# Patient Record
Sex: MALE | Race: BLACK OR AFRICAN AMERICAN | NOT HISPANIC OR LATINO | Employment: UNEMPLOYED | URBAN - METROPOLITAN AREA
[De-identification: names, ages, dates, MRNs, and addresses within clinical notes are randomized per-mention and may not be internally consistent; named-entity substitution may affect disease eponyms.]

---

## 2020-08-08 ENCOUNTER — OFFICE VISIT (OUTPATIENT)
Dept: URGENT CARE | Facility: CLINIC | Age: 30
End: 2020-08-08
Payer: MEDICAID

## 2020-08-08 VITALS
HEART RATE: 55 BPM | DIASTOLIC BLOOD PRESSURE: 64 MMHG | HEIGHT: 75 IN | RESPIRATION RATE: 18 BRPM | WEIGHT: 173 LBS | BODY MASS INDEX: 21.51 KG/M2 | SYSTOLIC BLOOD PRESSURE: 124 MMHG | TEMPERATURE: 98 F | OXYGEN SATURATION: 97 %

## 2020-08-08 DIAGNOSIS — J02.9 SORE THROAT: Primary | ICD-10-CM

## 2020-08-08 DIAGNOSIS — Z11.59 SCREENING FOR VIRAL DISEASE: ICD-10-CM

## 2020-08-08 LAB
CTP QC/QA: YES
MOLECULAR STREP A: NEGATIVE

## 2020-08-08 PROCEDURE — 87651 STREP A DNA AMP PROBE: CPT | Mod: QW,S$GLB,, | Performed by: NURSE PRACTITIONER

## 2020-08-08 PROCEDURE — 87651 POCT STREP A MOLECULAR: ICD-10-PCS | Mod: QW,S$GLB,, | Performed by: NURSE PRACTITIONER

## 2020-08-08 PROCEDURE — U0003 INFECTIOUS AGENT DETECTION BY NUCLEIC ACID (DNA OR RNA); SEVERE ACUTE RESPIRATORY SYNDROME CORONAVIRUS 2 (SARS-COV-2) (CORONAVIRUS DISEASE [COVID-19]), AMPLIFIED PROBE TECHNIQUE, MAKING USE OF HIGH THROUGHPUT TECHNOLOGIES AS DESCRIBED BY CMS-2020-01-R: HCPCS

## 2020-08-08 PROCEDURE — 99202 PR OFFICE/OUTPT VISIT, NEW, LEVL II, 15-29 MIN: ICD-10-PCS | Mod: S$GLB,,, | Performed by: NURSE PRACTITIONER

## 2020-08-08 PROCEDURE — 99202 OFFICE O/P NEW SF 15 MIN: CPT | Mod: S$GLB,,, | Performed by: NURSE PRACTITIONER

## 2020-08-08 NOTE — PROGRESS NOTES
"Subjective:       Patient ID: Lior Nguyen III is a 30 y.o. male.    Vitals:  height is 6' 3" (1.905 m) and weight is 78.5 kg (173 lb). His temperature is 98 °F (36.7 °C). His blood pressure is 124/64 and his pulse is 55 (abnormal). His respiration is 18 and oxygen saturation is 97%.     Chief Complaint: COVID-19 Concerns    This is a 30 y.o. male who presents today with a chief complaint of sore throat for 2 days and diarrhea for 3 days. Patient wants to be tested for Covid-19.  Provider note begins below  Denies fever. Denies ill contacts.      Sore Throat   This is a new problem. The current episode started in the past 7 days. The problem has been gradually worsening. There has been no fever. The pain is at a severity of 4/10. The pain is mild. Associated symptoms include diarrhea and trouble swallowing. Pertinent negatives include no congestion, coughing, ear pain, shortness of breath, stridor or vomiting. He has had no exposure to strep or mono. He has tried acetaminophen for the symptoms. The treatment provided mild relief.   Diarrhea   This is a new problem. The current episode started in the past 7 days. The problem occurs 5 to 10 times per day. The problem has been unchanged. The stool consistency is described as watery. The patient states that diarrhea awakens him from sleep. Pertinent negatives include no chills, coughing, fever, myalgias or vomiting. Nothing aggravates the symptoms. There are no known risk factors. Treatments tried: peptobismol. The treatment provided mild relief. There is no history of irritable bowel syndrome.       Constitution: Negative for chills, sweating, fatigue and fever.   HENT: Positive for sore throat and trouble swallowing. Negative for ear pain, congestion, sinus pain, sinus pressure and voice change.    Neck: Negative for painful lymph nodes.   Eyes: Negative for eye redness.   Respiratory: Negative for chest tightness, cough, sputum production, bloody sputum, COPD, " shortness of breath, stridor, wheezing and asthma.    Gastrointestinal: Positive for diarrhea. Negative for nausea and vomiting.   Musculoskeletal: Negative for muscle ache.   Skin: Negative for rash.   Allergic/Immunologic: Negative for seasonal allergies and asthma.   Hematologic/Lymphatic: Negative for swollen lymph nodes.       Objective:      Physical Exam   Constitutional: He is oriented to person, place, and time.   HENT:   Head: Normocephalic and atraumatic.   Cardiovascular: Bradycardia present.   Pulmonary/Chest: Effort normal. No respiratory distress.   Abdominal: Normal appearance.   Neurological: He is alert and oriented to person, place, and time. Psychiatric: His behavior is normal. Mood normal.         Results for orders placed or performed in visit on 08/08/20   POCT Strep A, Molecular   Result Value Ref Range    Molecular Strep A, POC Negative Negative     Acceptable Yes      Assessment:       1. Sore throat    2. Screening for viral disease        Plan:         Strep test negative.    Counseled patient and answered questions in regards to COVID-19 testing and diagnosis.    Sore throat  -     POCT Strep A, Molecular  -     COVID-19 Routine Screening    Screening for viral disease  -     COVID-19 Routine Screening         Patient Instructions   Guidelines for General Prevention of COVID-19    o Take steps to protect yourself from COVID-19. Perform hand hygiene frequently. Wash your hands often with soap and water for at least 20 seconds of use and alcohol-based hand , covering all surfaces of your hands and rubbing them together until they feel dry.  o Avoid touching your eyes, nose, and mouth with unwashed hands.  o Avoid close contact with people and stay home if youre sick, except to get medical care.   o Cover coughs and sneezes with a tissue, or use the inside of your elbow. Immediately wash your hands or use hand .     For more information, see CDC link  below:    https://www.cdc.gov/coronavirus/2019-ncov/hcp/guidance-prevent-spread.html#precautions

## 2020-08-09 LAB — SARS-COV-2 RNA RESP QL NAA+PROBE: DETECTED

## 2020-08-10 ENCOUNTER — TELEPHONE (OUTPATIENT)
Dept: URGENT CARE | Facility: CLINIC | Age: 30
End: 2020-08-10

## 2020-08-10 DIAGNOSIS — U07.1 COVID-19 VIRUS DETECTED: ICD-10-CM

## 2020-08-10 NOTE — LETTER
[address] [facility] ? [place], [address] ? Phone [phone] ? Fax [phone]           Return to Work/School    Patient: Lior Nguyen III  YOB: 1990   Date: 08/10/2020      To Whom It May Concern:     Lior Nguyen III was in contact with/seen in my office on 08/08/2020. COVID-19 is present in our communities across the state. Not all patients are eligible or appropriate to be tested. In this situation, your employee meets the following criteria:     Lior Nguyen III has met the criteria for COVID-19 testing and has a POSITIVE result. He can return to work once they are asymptomatic for 24 hours without the use of fever reducing medications AND at least ten days from the start of symptoms (or from the first positive result if they have no symptoms).      If you have any questions or concerns, or if I can be of further assistance, please do not hesitate to contact me.     Sincerely,    Briseyda Henson PA-C

## 2020-08-10 NOTE — TELEPHONE ENCOUNTER
DISCUSSED POSITIVE COVID RESULTS WITH PATIENT.  DISCUSSED HOME ISOLATION PRECAUTIONS.  All of his questions were answered he verbalized understanding.    Your test was POSITIVE for COVID-19 (coronavirus).     Instructions for Home Care of Patients and Caretakers with Coronavirus Disease 2019     Limit visitors to the home.  Older persons and those that have chronic medical conditions such as diabetes, lung and heart disease are at increased risk for illness.    If possible, patients should use a separate bedroom while recovering. Caregivers and household members should avoid prolonged contact with the patient which means to stay 6 feet away and avoid contact with cough droplets.  When close contact is necessary, wash your hands before and immediately after contact.    Perform hand hygiene frequently. Wash your hands often with soap and water for at least 20 seconds or use an alcohol-based hand , covering all surfaces of your hands and rubbing them together until they feel dry.    Avoid touching your eyes, nose, and mouth with unwashed hands.   Avoid sharing household items with the patient. You should not share dishes, drinking glasses, cups, eating utensils, towels, bedding, or other items. After the patient uses these items, you should wash them thoroughly.   Wash laundry thoroughly.   o Immediately remove and wash clothes or bedding that have blood, stool, or body fluids on them.   Clean all high-touch surfaces, such as counters, tabletops, doorknobs, bathroom fixtures, toilets, phones, keyboards, tablets, and bedside tables, every day.   o Use a household cleaning spray or wipe, according to the label instructions. Labels contain instructions for safe and effective use of the cleaning product including precautions you should take when applying the product, such as wearing gloves and making sure you have good ventilation during use of the product.    For more information see CDC link below.       https://www.cdc.gov/coronavirus/2019-ncov/hcp/guidance-prevent-spread.html#precautions               If your symptoms worsen or if you have any other concerns, please contact [facility] at [phone].

## 2021-04-12 ENCOUNTER — PATIENT MESSAGE (OUTPATIENT)
Dept: RESEARCH | Facility: HOSPITAL | Age: 31
End: 2021-04-12

## 2021-05-13 ENCOUNTER — OFFICE VISIT (OUTPATIENT)
Dept: URGENT CARE | Facility: CLINIC | Age: 31
End: 2021-05-13
Payer: MEDICAID

## 2021-05-13 VITALS
OXYGEN SATURATION: 98 % | HEART RATE: 61 BPM | TEMPERATURE: 99 F | HEIGHT: 75 IN | WEIGHT: 173 LBS | DIASTOLIC BLOOD PRESSURE: 69 MMHG | RESPIRATION RATE: 16 BRPM | SYSTOLIC BLOOD PRESSURE: 106 MMHG | BODY MASS INDEX: 21.51 KG/M2

## 2021-05-13 DIAGNOSIS — R10.84 GENERALIZED ABDOMINAL PAIN: Primary | ICD-10-CM

## 2021-05-13 LAB
CTP QC/QA: YES
CTP QC/QA: YES
MOLECULAR STREP A: NEGATIVE
SARS-COV-2 RDRP RESP QL NAA+PROBE: NEGATIVE

## 2021-05-13 PROCEDURE — 99214 OFFICE O/P EST MOD 30 MIN: CPT | Mod: S$GLB,,, | Performed by: PHYSICIAN ASSISTANT

## 2021-05-13 PROCEDURE — 87651 POCT STREP A MOLECULAR: ICD-10-PCS | Mod: QW,S$GLB,, | Performed by: PHYSICIAN ASSISTANT

## 2021-05-13 PROCEDURE — 87651 STREP A DNA AMP PROBE: CPT | Mod: QW,S$GLB,, | Performed by: PHYSICIAN ASSISTANT

## 2021-05-13 PROCEDURE — 74019 XR ABDOMEN FLAT AND ERECT: ICD-10-PCS | Mod: FY,S$GLB,, | Performed by: RADIOLOGY

## 2021-05-13 PROCEDURE — 74019 RADEX ABDOMEN 2 VIEWS: CPT | Mod: FY,S$GLB,, | Performed by: RADIOLOGY

## 2021-05-13 PROCEDURE — 99214 PR OFFICE/OUTPT VISIT, EST, LEVL IV, 30-39 MIN: ICD-10-PCS | Mod: S$GLB,,, | Performed by: PHYSICIAN ASSISTANT

## 2021-05-13 PROCEDURE — U0002 COVID-19 LAB TEST NON-CDC: HCPCS | Mod: QW,S$GLB,, | Performed by: PHYSICIAN ASSISTANT

## 2021-05-13 PROCEDURE — U0002: ICD-10-PCS | Mod: QW,S$GLB,, | Performed by: PHYSICIAN ASSISTANT

## 2021-08-15 ENCOUNTER — OFFICE VISIT (OUTPATIENT)
Dept: URGENT CARE | Facility: CLINIC | Age: 31
End: 2021-08-15
Payer: MEDICAID

## 2021-08-15 VITALS
WEIGHT: 165 LBS | RESPIRATION RATE: 19 BRPM | TEMPERATURE: 99 F | SYSTOLIC BLOOD PRESSURE: 135 MMHG | HEIGHT: 74 IN | HEART RATE: 64 BPM | BODY MASS INDEX: 21.17 KG/M2 | DIASTOLIC BLOOD PRESSURE: 85 MMHG | OXYGEN SATURATION: 98 %

## 2021-08-15 DIAGNOSIS — Z20.822 COVID-19 RULED OUT: ICD-10-CM

## 2021-08-15 DIAGNOSIS — J06.9 UPPER RESPIRATORY VIRUS: Primary | ICD-10-CM

## 2021-08-15 LAB
CTP QC/QA: YES
SARS-COV-2 RDRP RESP QL NAA+PROBE: NEGATIVE

## 2021-08-15 PROCEDURE — U0002 COVID-19 LAB TEST NON-CDC: HCPCS | Mod: QW,S$GLB,, | Performed by: NURSE PRACTITIONER

## 2021-08-15 PROCEDURE — U0002: ICD-10-PCS | Mod: QW,S$GLB,, | Performed by: NURSE PRACTITIONER

## 2021-08-15 PROCEDURE — 99213 PR OFFICE/OUTPT VISIT, EST, LEVL III, 20-29 MIN: ICD-10-PCS | Mod: S$GLB,,, | Performed by: NURSE PRACTITIONER

## 2021-08-15 PROCEDURE — 99213 OFFICE O/P EST LOW 20 MIN: CPT | Mod: S$GLB,,, | Performed by: NURSE PRACTITIONER

## 2021-09-10 ENCOUNTER — IMMUNIZATION (OUTPATIENT)
Dept: INTERNAL MEDICINE | Facility: CLINIC | Age: 31
End: 2021-09-10
Payer: MEDICAID

## 2021-09-10 DIAGNOSIS — Z23 NEED FOR VACCINATION: Primary | ICD-10-CM

## 2021-09-10 PROCEDURE — 91301 COVID-19, MRNA, LNP-S, PF, 100 MCG/0.5 ML DOSE VACCINE: ICD-10-PCS | Mod: ,,, | Performed by: INTERNAL MEDICINE

## 2021-09-10 PROCEDURE — 0012A COVID-19, MRNA, LNP-S, PF, 100 MCG/0.5 ML DOSE VACCINE: ICD-10-PCS | Mod: CV19,,, | Performed by: INTERNAL MEDICINE

## 2021-09-10 PROCEDURE — 91301 COVID-19, MRNA, LNP-S, PF, 100 MCG/0.5 ML DOSE VACCINE: CPT | Mod: ,,, | Performed by: INTERNAL MEDICINE

## 2021-09-10 PROCEDURE — 0012A COVID-19, MRNA, LNP-S, PF, 100 MCG/0.5 ML DOSE VACCINE: CPT | Mod: CV19,,, | Performed by: INTERNAL MEDICINE

## 2021-12-18 ENCOUNTER — OFFICE VISIT (OUTPATIENT)
Dept: URGENT CARE | Facility: CLINIC | Age: 31
End: 2021-12-18
Payer: MEDICAID

## 2021-12-18 VITALS
OXYGEN SATURATION: 95 % | BODY MASS INDEX: 21.17 KG/M2 | TEMPERATURE: 98 F | HEIGHT: 74 IN | WEIGHT: 165 LBS | HEART RATE: 74 BPM | DIASTOLIC BLOOD PRESSURE: 71 MMHG | RESPIRATION RATE: 16 BRPM | SYSTOLIC BLOOD PRESSURE: 106 MMHG

## 2021-12-18 DIAGNOSIS — M25.561 ACUTE PAIN OF RIGHT KNEE: Primary | ICD-10-CM

## 2021-12-18 PROCEDURE — 99214 PR OFFICE/OUTPT VISIT, EST, LEVL IV, 30-39 MIN: ICD-10-PCS | Mod: S$GLB,,, | Performed by: PHYSICIAN ASSISTANT

## 2021-12-18 PROCEDURE — 99214 OFFICE O/P EST MOD 30 MIN: CPT | Mod: S$GLB,,, | Performed by: PHYSICIAN ASSISTANT

## 2021-12-18 PROCEDURE — 73562 XR KNEE 3 VIEW RIGHT: ICD-10-PCS | Mod: FY,RT,S$GLB, | Performed by: RADIOLOGY

## 2021-12-18 PROCEDURE — 73562 X-RAY EXAM OF KNEE 3: CPT | Mod: FY,RT,S$GLB, | Performed by: RADIOLOGY

## 2021-12-18 RX ORDER — METHOCARBAMOL 750 MG/1
750 TABLET, FILM COATED ORAL 3 TIMES DAILY
Qty: 15 TABLET | Refills: 0 | Status: SHIPPED | OUTPATIENT
Start: 2021-12-18 | End: 2021-12-23

## 2021-12-18 RX ORDER — NAPROXEN 500 MG/1
500 TABLET ORAL 2 TIMES DAILY
Qty: 30 TABLET | Refills: 0 | Status: SHIPPED | OUTPATIENT
Start: 2021-12-18 | End: 2022-01-02

## 2021-12-20 ENCOUNTER — TELEPHONE (OUTPATIENT)
Dept: URGENT CARE | Facility: CLINIC | Age: 31
End: 2021-12-20
Payer: MEDICAID

## 2022-07-08 ENCOUNTER — OFFICE VISIT (OUTPATIENT)
Dept: URGENT CARE | Facility: CLINIC | Age: 32
End: 2022-07-08
Payer: MEDICAID

## 2022-07-08 VITALS
WEIGHT: 147 LBS | SYSTOLIC BLOOD PRESSURE: 117 MMHG | HEIGHT: 74 IN | DIASTOLIC BLOOD PRESSURE: 63 MMHG | HEART RATE: 61 BPM | RESPIRATION RATE: 18 BRPM | OXYGEN SATURATION: 99 % | TEMPERATURE: 98 F | BODY MASS INDEX: 18.87 KG/M2

## 2022-07-08 DIAGNOSIS — F41.9 ANXIETY: ICD-10-CM

## 2022-07-08 DIAGNOSIS — R10.9 ABDOMINAL PAIN, UNSPECIFIED ABDOMINAL LOCATION: Primary | ICD-10-CM

## 2022-07-08 DIAGNOSIS — Z76.89 ENCOUNTER TO ESTABLISH CARE WITH NEW DOCTOR: ICD-10-CM

## 2022-07-08 DIAGNOSIS — J06.9 ACUTE URI: ICD-10-CM

## 2022-07-08 LAB
BILIRUB UR QL STRIP: NEGATIVE
CTP QC/QA: YES
GLUCOSE UR QL STRIP: NEGATIVE
KETONES UR QL STRIP: NEGATIVE
LEUKOCYTE ESTERASE UR QL STRIP: NEGATIVE
PH, POC UA: 5 (ref 5–8)
POC BLOOD, URINE: NEGATIVE
POC NITRATES, URINE: NEGATIVE
PROT UR QL STRIP: POSITIVE
SARS-COV-2 RDRP RESP QL NAA+PROBE: NEGATIVE
SP GR UR STRIP: 1.02 (ref 1–1.03)
UROBILINOGEN UR STRIP-ACNC: NORMAL (ref 0.3–2.2)

## 2022-07-08 PROCEDURE — 3008F BODY MASS INDEX DOCD: CPT | Mod: CPTII,S$GLB,, | Performed by: NURSE PRACTITIONER

## 2022-07-08 PROCEDURE — 3078F PR MOST RECENT DIASTOLIC BLOOD PRESSURE < 80 MM HG: ICD-10-PCS | Mod: CPTII,S$GLB,, | Performed by: NURSE PRACTITIONER

## 2022-07-08 PROCEDURE — 81003 URINALYSIS AUTO W/O SCOPE: CPT | Mod: QW,S$GLB,, | Performed by: NURSE PRACTITIONER

## 2022-07-08 PROCEDURE — U0002: ICD-10-PCS | Mod: QW,S$GLB,, | Performed by: NURSE PRACTITIONER

## 2022-07-08 PROCEDURE — 81003 POCT URINALYSIS, DIPSTICK, AUTOMATED, W/O SCOPE: ICD-10-PCS | Mod: QW,S$GLB,, | Performed by: NURSE PRACTITIONER

## 2022-07-08 PROCEDURE — 1159F PR MEDICATION LIST DOCUMENTED IN MEDICAL RECORD: ICD-10-PCS | Mod: CPTII,S$GLB,, | Performed by: NURSE PRACTITIONER

## 2022-07-08 PROCEDURE — 3074F SYST BP LT 130 MM HG: CPT | Mod: CPTII,S$GLB,, | Performed by: NURSE PRACTITIONER

## 2022-07-08 PROCEDURE — 99214 OFFICE O/P EST MOD 30 MIN: CPT | Mod: S$GLB,,, | Performed by: NURSE PRACTITIONER

## 2022-07-08 PROCEDURE — 99214 PR OFFICE/OUTPT VISIT, EST, LEVL IV, 30-39 MIN: ICD-10-PCS | Mod: S$GLB,,, | Performed by: NURSE PRACTITIONER

## 2022-07-08 PROCEDURE — 74019 XR ABDOMEN FLAT AND ERECT: ICD-10-PCS | Mod: FY,S$GLB,, | Performed by: RADIOLOGY

## 2022-07-08 PROCEDURE — 3078F DIAST BP <80 MM HG: CPT | Mod: CPTII,S$GLB,, | Performed by: NURSE PRACTITIONER

## 2022-07-08 PROCEDURE — 3008F PR BODY MASS INDEX (BMI) DOCUMENTED: ICD-10-PCS | Mod: CPTII,S$GLB,, | Performed by: NURSE PRACTITIONER

## 2022-07-08 PROCEDURE — 1159F MED LIST DOCD IN RCRD: CPT | Mod: CPTII,S$GLB,, | Performed by: NURSE PRACTITIONER

## 2022-07-08 PROCEDURE — U0002 COVID-19 LAB TEST NON-CDC: HCPCS | Mod: QW,S$GLB,, | Performed by: NURSE PRACTITIONER

## 2022-07-08 PROCEDURE — 1160F RVW MEDS BY RX/DR IN RCRD: CPT | Mod: CPTII,S$GLB,, | Performed by: NURSE PRACTITIONER

## 2022-07-08 PROCEDURE — 3074F PR MOST RECENT SYSTOLIC BLOOD PRESSURE < 130 MM HG: ICD-10-PCS | Mod: CPTII,S$GLB,, | Performed by: NURSE PRACTITIONER

## 2022-07-08 PROCEDURE — 1160F PR REVIEW ALL MEDS BY PRESCRIBER/CLIN PHARMACIST DOCUMENTED: ICD-10-PCS | Mod: CPTII,S$GLB,, | Performed by: NURSE PRACTITIONER

## 2022-07-08 PROCEDURE — 74019 RADEX ABDOMEN 2 VIEWS: CPT | Mod: FY,S$GLB,, | Performed by: RADIOLOGY

## 2022-07-08 RX ORDER — HYDROXYZINE HYDROCHLORIDE 25 MG/1
25 TABLET, FILM COATED ORAL NIGHTLY PRN
Qty: 20 TABLET | Refills: 0 | Status: ON HOLD | OUTPATIENT
Start: 2022-07-08 | End: 2023-01-11

## 2022-07-08 NOTE — PATIENT INSTRUCTIONS
PLEASE READ YOUR DISCHARGE INSTRUCTIONS ENTIRELY AS IT CONTAINS IMPORTANT INFORMATION.    PLENTY OF FLUIDS    Try an over the counter laxative like miralax and glycerin suppositories.     High fiber meals    Try an over the counter fleets enema    Please go to the ER if you experience worsening of symptoms, inability to pass gas or stool, blood in your vomit or stool or dark black color, passing out, severely worsening pain.     Please see your primary care doctor if symptoms do not improve    You must understand that you have received an Urgent Care treatment only and that you may be released before all of your medical problems are known or treated.  WE CANNOT RULE OUT ALL POSSIBLE CAUSES OF YOUR SYMPTOMS IN THE URGENT CARE SETTING PLEASE GO TO THE ER IF YOU FEELS YOUR CONDITION IS WORSENING OR YOU WOULD LIKE EMERGENT EVALUATION.

## 2022-07-08 NOTE — PROGRESS NOTES
"Subjective:       Patient ID: Lior Nguyen III is a 32 y.o. male.    Vitals:  height is 6' 2" (1.88 m) and weight is 66.7 kg (147 lb). His oral temperature is 98.1 °F (36.7 °C). His blood pressure is 117/63 and his pulse is 61. His respiration is 18 and oxygen saturation is 99%.     Chief Complaint: Abdominal Pain    Pt presents to urgent care for  pain in abdomen w/ diarrhea.  Pt did not take in medication for abdominal pain.     Pt also presents to urgent care for sneezing, pt has been taking zyrtec & benadryl for the symptoms.     Provider note below:  This is a 32 y.o. male who presents today with a chief complaint of abdominal pain started yesterday, patient reports he has been dealing with diarrhea for few months now, patient reports diarrhea 2 times per day, patient reports he tried to changes diet which helped with the diarrhea, patient also reports sneezing and post nasal drip and  taking Zyrtec and Benadryl for the symptoms, denies any exposure to COVID-19, denies urinary symptoms, denies urinary frequency, urgency, dysuria hematuria, denies back pain or flank pain, denies fever, body aches or chills, denies cough, wheezing or shortness of breath, denies nausea, vomiting, denies chest pain or dizziness positional lightheadedness, denies sore throat or trouble swallowing, denies loss of taste or smell, or any other symptoms  While discussing the abdominal x-ray results with patient for discharge, patient reported that he would like to address one other issue, patient reported that he does have history of anxiety and because of  anxiety sometimes he cannot sleep for 2-3 days, patient reports history of schizophrenia  in the family (grandfather) with family history of parents with suicidal ideation, patient denies any suicidal or homicidal ideation, patient reports he has been dealing with this issue for past 10 years and  has seen Psychiatry who prescribed him the medication that makes him sleepy for 12 " hours so that is why he stopped taking the medication as he works 2 jobs, I discussed with patient in detail that he should obtain further evaluation if he feels any suicidal or homicidal ideation or for worsening symptoms, declined any suicidal or homicidal ideation or plan    Abdominal Pain  This is a new problem. The current episode started yesterday. The onset quality is sudden. The problem has been waxing and waning. The pain is located in the generalized abdominal region. The pain is moderate. The quality of the pain is sharp and dull. The abdominal pain does not radiate. Associated symptoms include diarrhea, flatus and headaches. Pertinent negatives include no constipation, fever, nausea or vomiting. Exacerbated by: spicy food. Relieved by: imodium, pepto bismol, nexium. He has tried nothing for the symptoms. The treatment provided no relief.       Constitution: Negative for chills, sweating, fatigue and fever.   HENT: Positive for postnasal drip. Negative for sinus pain, sinus pressure and sore throat.    Cardiovascular: Negative for chest pain.   Respiratory: Negative for chest tightness, sputum production, shortness of breath and wheezing.    Gastrointestinal: Positive for abdominal pain and diarrhea. Negative for nausea, vomiting and constipation.   Allergic/Immunologic: Negative for environmental allergies and seasonal allergies.   Neurological: Positive for headaches. Negative for dizziness, light-headedness and altered mental status.   Psychiatric/Behavioral: Negative for altered mental status, hallucinations, homicidal ideas, suicidal ideas, self-injury and substance abuse.       Objective:      Physical Exam   Constitutional: He is oriented to person, place, and time. He appears well-developed.   HENT:   Head: Normocephalic and atraumatic.   Ears:   Right Ear: External ear normal.   Left Ear: External ear normal.   Nose: Nose normal.   Mouth/Throat: Mucous membranes are normal.   Eyes: Conjunctivae  and lids are normal. Extraocular movement intact   Neck: Trachea normal. Neck supple.   Cardiovascular: Normal rate, regular rhythm and normal heart sounds.   Pulmonary/Chest: Effort normal and breath sounds normal. No respiratory distress.   Abdominal: Normal appearance and bowel sounds are normal. He exhibits no distension, no abdominal bruit, no pulsatile midline mass and no mass. Soft. There is generalized abdominal tenderness. There is no rebound, no guarding, no tenderness at McBurney's point, negative Lazaro's sign, no left CVA tenderness, negative Rovsing's sign, negative psoas sign, no right CVA tenderness and negative obturator sign.   Musculoskeletal: Normal range of motion.         General: Normal range of motion.   Neurological: no focal deficit. He is alert and oriented to person, place, and time. He has normal strength.   Skin: Skin is warm, dry, intact, not diaphoretic and not pale.   Psychiatric: His speech is normal and behavior is normal. Mood, affect, judgment and thought content normal. Cognition normalHe expresses no homicidal and no suicidal ideation. He expresses no suicidal plans and no homicidal plans.   Nursing note and vitals reviewed.      X-Ray Abdomen Flat And Erect    Result Date: 7/8/2022  EXAMINATION: XR ABDOMEN FLAT AND ERECT CLINICAL HISTORY: Unspecified abdominal pain. TECHNIQUE: Flat and erect frontal views of the abdomen were performed. COMPARISON: 05/13/2021. FINDINGS: Bowel gas pattern is nonobstructive.  No evidence of pneumoperitoneum.  No large volume fecal burden.  No pathologic calcifications.  Mild left convex scoliotic curvature in the lumbar spine.     Nonobstructive bowel gas pattern. Electronically signed by: Gary Smith MD Date:    07/08/2022 Time:    11:21  Results for orders placed or performed in visit on 07/08/22   POCT COVID-19 Rapid Screening   Result Value Ref Range    POC Rapid COVID Negative Negative     Acceptable Yes    POCT  Urinalysis, Dipstick, Automated, W/O Scope   Result Value Ref Range    POC Blood, Urine Negative Negative    POC Bilirubin, Urine Negative Negative    POC Urobilinogen, Urine Normal 0.3 - 2.2    POC Ketones, Urine Negative Negative    POC Protein, Urine Positive (A) Negative    POC Nitrates, Urine Negative Negative    POC Glucose, Urine Negative Negative    pH, UA 5.0 5 - 8    POC Specific Gravity, Urine 1.025 1.003 - 1.029    POC Leukocytes, Urine Negative Negative         Patient in no acute distress.  Vitals reassuring.  Discussed results/diagnosis/plan in depth with patient in clinic. Strict precautions given to patient to monitor for worsening signs and symptoms. Advised to follow up with primary.All questions answered. Strict ER precautions given. If your symptoms worsens or fail to improve you should go to the Emergency Room. Discharge and follow-up instructions given verbally/printed. Discharge and follow-up instructions discussed with the patient who expressed understanding and willingness to comply with my recommendations.Patient voiced understanding and in agreement with current treatment plan.     Please be advised this text was dictated with HipWay software and may contain errors due to translation.      Assessment:       1. Abdominal pain, unspecified abdominal location    2. Acute URI    3. Encounter to establish care with new doctor    4. Anxiety          Plan:         Abdominal pain, unspecified abdominal location  -     POCT Urinalysis, Dipstick, Automated, W/O Scope  -     X-Ray Abdomen Flat And Erect; Future; Expected date: 07/08/2022    Acute URI  -     POCT COVID-19 Rapid Screening    Encounter to establish care with new doctor  -     Ambulatory referral/consult to Family Practice    Anxiety  -     hydrOXYzine HCL (ATARAX) 25 MG tablet; Take 1 tablet (25 mg total) by mouth nightly as needed. Prn insomnia  Dispense: 20 tablet; Refill: 0           Medical Decision Making:   History:   Old  Records Summarized: records from clinic visits.  Differential Diagnosis:   Differential diagnoses included but not limited to UTI, pyelonephritis, gallstones, acute pancreatitis, diverticulitis, appendicitis, renal colic, constipation, GERD, IBD    Clinical Tests:   Radiological Study: Ordered and Reviewed  Urgent Care Management:  Patient in no acute distress.  Vitals reassuring.  On exam patient is nontoxic appearing and afebrile.  Negative COVID-19 and urinalysis results discussed with patient detail.  Abdominal x-ray results discussed with patient in detail.  Physical examination consistent with generalized abdominal tenderness.  No guarding, rigidity or rebound tenderness noted.  Over-the-counter medications discussed with patient in detail.  Proper hydration advised.  Referred to family practice to establish care.Patient voiced understanding and in agreement with current treatment plan.  See detailed HPI, patient reported history of anxiety in the past, patient reported dealing with anxiety for past 10 years was prescribed the medication but could not take it due to the side effects of drowsiness and he does work 2 jobs.  Patient reported history of psychiatric illness in the family.  Patient denies any suicidal or homicidal ideation.  Patient reports due to his anxiety he cannot sleep sometimes 2-3 days and experiences auditory hallucination at that time.  Denies any recent anxiety issue, insomnia or other worsening symptoms.  I again discussed with patient in detail and patient declined suicidal or homicidal ideation or hallucinations.  I will prescribe limited amount of Atarax to help with anxiety and insomnia.  I strongly recommended patient to obtain further evaluation or go to ER for worsening symptoms.  Patient is stable to discharge with strict ER precautions.  Attempted to schedule the appointment with the family practice to establish care, patient insurance is Medicaid and unable to find any  availability secondary to his insurance.  Community resources discussed to patient to establish care with primary. Patient voiced understanding and in agreement with current treatment plan.    >45 minutes   Other:   I have discussed this case with another health care provider.       <> Summary of the Discussion: Gary Ch NP       Patient Instructions   PLEASE READ YOUR DISCHARGE INSTRUCTIONS ENTIRELY AS IT CONTAINS IMPORTANT INFORMATION.    PLENTY OF FLUIDS    Try an over the counter laxative like miralax and glycerin suppositories.     High fiber meals    Try an over the counter fleets enema    Please go to the ER if you experience worsening of symptoms, inability to pass gas or stool, blood in your vomit or stool or dark black color, passing out, severely worsening pain.     Please see your primary care doctor if symptoms do not improve    You must understand that you have received an Urgent Care treatment only and that you may be released before all of your medical problems are known or treated.  WE CANNOT RULE OUT ALL POSSIBLE CAUSES OF YOUR SYMPTOMS IN THE URGENT CARE SETTING PLEASE GO TO THE ER IF YOU FEELS YOUR CONDITION IS WORSENING OR YOU WOULD LIKE EMERGENT EVALUATION.

## 2022-07-16 ENCOUNTER — TELEPHONE (OUTPATIENT)
Dept: URGENT CARE | Facility: CLINIC | Age: 32
End: 2022-07-16
Payer: MEDICAID

## 2022-07-21 ENCOUNTER — OFFICE VISIT (OUTPATIENT)
Dept: FAMILY MEDICINE | Facility: HOSPITAL | Age: 32
End: 2022-07-21
Attending: FAMILY MEDICINE
Payer: MEDICAID

## 2022-07-21 VITALS
SYSTOLIC BLOOD PRESSURE: 109 MMHG | DIASTOLIC BLOOD PRESSURE: 74 MMHG | BODY MASS INDEX: 18.82 KG/M2 | HEIGHT: 74 IN | HEART RATE: 99 BPM | WEIGHT: 146.63 LBS

## 2022-07-21 DIAGNOSIS — F17.209 TOBACCO USE DISORDER, CONTINUOUS: ICD-10-CM

## 2022-07-21 DIAGNOSIS — G47.00 INSOMNIA, UNSPECIFIED TYPE: Primary | ICD-10-CM

## 2022-07-21 DIAGNOSIS — Z00.00 WELLNESS EXAMINATION: ICD-10-CM

## 2022-07-21 PROCEDURE — 99214 OFFICE O/P EST MOD 30 MIN: CPT

## 2022-07-21 RX ORDER — QUETIAPINE FUMARATE 25 MG/1
25 TABLET, FILM COATED ORAL NIGHTLY
Qty: 30 TABLET | Refills: 11 | Status: SHIPPED | OUTPATIENT
Start: 2022-07-21 | End: 2022-08-01 | Stop reason: SDUPTHER

## 2022-07-22 NOTE — PROGRESS NOTES
[facility]  History & Physical    SUBJECTIVE:     Chief Complaint:   Chief Complaint   Patient presents with    Depression    Establish Care    Insomnia    Anxiety    Diarrhea    Fatigue     Since 2005       History of Present Illness:  32 y.o. male who  has a past medical history of Anxiety. presents to clinic today for establishing care.  Patient notes insomnia, anxiety and a history of several emotionally traumatic episodes from the past. Recently patient   Endorses auditory hallucinations, but denies thoughts of suicide or causing harm. He describes periods (sometimes days) of being unable to sleep, but   Does not describe delusions of grandeur, hypersexuality or similar emotions.  He also notes chronic diarrhea, occasional constipation (no blood or mucus) with no recent changes, weight loss or other constitutional symptoms.   He is working to quit smoking/vaping.   He wishes to speak to counselor.   Psych screening questionnaires:   RADHA-9 - 20  KEVAN-7 - 21  Mood disorder questionnaire - positive (on file)     Notes hx of schizophrenia in grandfather.    Allergies:  Review of patient's allergies indicates:  No Known Allergies    Home Medications:  Current Outpatient Medications on File Prior to Visit   Medication Sig    hydrOXYzine HCL (ATARAX) 25 MG tablet Take 1 tablet (25 mg total) by mouth nightly as needed. Prn insomnia    ibuprofen (ADVIL,MOTRIN) 800 MG tablet Take 1 tablet (800 mg total) by mouth every 8 (eight) hours as needed for Pain. (Patient not taking: No sig reported)     No current facility-administered medications on file prior to visit.       Past Medical History:   Diagnosis Date    Anxiety      No past surgical history on file.  Family History   Problem Relation Age of Onset    No Known Problems Mother     No Known Problems Father      Social History     Tobacco Use    Smoking status: Current Every Day Smoker     Packs/day: 0.50     Types: Vaping with nicotine    Smokeless  tobacco: Never Used   Substance Use Topics    Alcohol use: Yes     Alcohol/week: 2.0 standard drinks     Types: 2 Glasses of wine per week    Drug use: Never        ROS     OBJECTIVE:     Vital Signs:  Pulse: 99 (07/21/22 1006)  BP: 109/74 (07/21/22 1006)    Physical Exam    Gen: patient is anxious, but breathing well  HENT: normocephalic, atraumatic  Cardio: S1/S2 normal rate and rhythm   Resp: lungs clear bilat  Abd: soft, supple, nontender  Psych:   RADHA-9 - 20  KVEAN-7 - 21  Mood disorder questionnaire - positive (on file)     A/P:  Lior was seen today for depression, establish care, insomnia, anxiety, diarrhea and fatigue.    Diagnoses and all orders for this visit:    Insomnia, unspecified type  -     QUEtiapine (SEROQUEL) 25 MG Tab; Take 1 tablet (25 mg total) by mouth every evening.  -     Ambulatory referral/consult to Psychology; Future    Tobacco use disorder, continuous  -     Ambulatory referral/consult to Smoking Cessation Program; Future    Wellness examination  -     Comprehensive Metabolic Panel; Future  -     CBC Auto Differential; Future  -     Lipid Panel; Future  -     Hemoglobin A1C; Future  -     TSH; Future          DUE AT NEXT/FUTURE VISIT:  Follow up one week  States will try to speak to outside psychologist, has appointment to see Dr. Blackmon in Sept.      No follow-ups on file.      John Villalobos  [facility], PGY-1  07/22/2022    This note was partially created using [employer] Voice Recognition software. Typographical and content errors may occur with this process. While efforts are made to detect and correct such errors, in some cases errors will persist. For this reason, wording in this document should be considered in the proper context and not strictly verbatim     The following information is provided to all patients.  This information is to help you find resources for any of the problems found today that may be affecting your health:                Living healthy  guide: www.[facility].[place].[facility]       Understanding Diabetes: www.diabetes.org       Eating healthy: www.cdc.gov/healthyweight      CDC home safety checklist: www.cdc.gov/steadi/patient.html      Agency on Aging: www.goea.[place].[facility]       Alcoholics anonymous (AA): www.aa.org      Physical Activity: www.george.nih.gov/es6sbyh       Tobacco use: www.quitwithusla.org

## 2022-07-22 NOTE — PROGRESS NOTES
I assume primary medical responsibility for this patient. I have reviewed the history, physical, and assessement & treatment plan with the resident and agree that the care is reasonable and necessary. This service has been performed by a resident with the presence of a teaching physician under the primary care exception. If necessary, an addendum of additional findings or evaluation beyond the resident documentation will be noted below.    Patient with extensive trauma history. Expresses sometimes staying awake 72-80 hours due to life circumstances (multiple jobs) and a fear of falling asleep. He has had these episodes of insomnia for most of his life, but feels they may becoming more frequent. His family history is has significant psychiatric diagnoses including schizophrenia in his grandfather and depression/anxiety in other family members. Resources provided, made appointment with in house psychologist, and started low-dose seroquel rather than trazodone to help with sleep. Will follow-up closely with patient.     Jaleesa Armenta MD    [facility]

## 2022-07-26 ENCOUNTER — LAB VISIT (OUTPATIENT)
Dept: LAB | Facility: HOSPITAL | Age: 32
End: 2022-07-26
Payer: MEDICAID

## 2022-07-26 DIAGNOSIS — Z00.00 WELLNESS EXAMINATION: ICD-10-CM

## 2022-07-26 LAB
ALBUMIN SERPL BCP-MCNC: 4.2 G/DL (ref 3.5–5.2)
ALP SERPL-CCNC: 124 U/L (ref 55–135)
ALT SERPL W/O P-5'-P-CCNC: 15 U/L (ref 10–44)
ANION GAP SERPL CALC-SCNC: 12 MMOL/L (ref 8–16)
AST SERPL-CCNC: 20 U/L (ref 10–40)
BASOPHILS # BLD AUTO: 0.05 K/UL (ref 0–0.2)
BASOPHILS NFR BLD: 0.5 % (ref 0–1.9)
BILIRUB SERPL-MCNC: 0.8 MG/DL (ref 0.1–1)
BUN SERPL-MCNC: 13 MG/DL (ref 6–20)
CALCIUM SERPL-MCNC: 9.1 MG/DL (ref 8.7–10.5)
CHLORIDE SERPL-SCNC: 105 MMOL/L (ref 95–110)
CHOLEST SERPL-MCNC: 190 MG/DL (ref 120–199)
CHOLEST/HDLC SERPL: 2.6 {RATIO} (ref 2–5)
CO2 SERPL-SCNC: 22 MMOL/L (ref 23–29)
CREAT SERPL-MCNC: 1 MG/DL (ref 0.5–1.4)
DIFFERENTIAL METHOD: ABNORMAL
EOSINOPHIL # BLD AUTO: 0.1 K/UL (ref 0–0.5)
EOSINOPHIL NFR BLD: 1.5 % (ref 0–8)
ERYTHROCYTE [DISTWIDTH] IN BLOOD BY AUTOMATED COUNT: 11.9 % (ref 11.5–14.5)
EST. GFR  (AFRICAN AMERICAN): >60 ML/MIN/1.73 M^2
EST. GFR  (NON AFRICAN AMERICAN): >60 ML/MIN/1.73 M^2
ESTIMATED AVG GLUCOSE: 97 MG/DL (ref 68–131)
GLUCOSE SERPL-MCNC: 83 MG/DL (ref 70–110)
HBA1C MFR BLD: 5 % (ref 4–5.6)
HCT VFR BLD AUTO: 40.5 % (ref 40–54)
HDLC SERPL-MCNC: 74 MG/DL (ref 40–75)
HDLC SERPL: 38.9 % (ref 20–50)
HGB BLD-MCNC: 12.7 G/DL (ref 14–18)
IMM GRANULOCYTES # BLD AUTO: 0.02 K/UL (ref 0–0.04)
IMM GRANULOCYTES NFR BLD AUTO: 0.2 % (ref 0–0.5)
LDLC SERPL CALC-MCNC: 107 MG/DL (ref 63–159)
LYMPHOCYTES # BLD AUTO: 2.8 K/UL (ref 1–4.8)
LYMPHOCYTES NFR BLD: 30.2 % (ref 18–48)
MCH RBC QN AUTO: 28.2 PG (ref 27–31)
MCHC RBC AUTO-ENTMCNC: 31.4 G/DL (ref 32–36)
MCV RBC AUTO: 90 FL (ref 82–98)
MONOCYTES # BLD AUTO: 0.7 K/UL (ref 0.3–1)
MONOCYTES NFR BLD: 7.5 % (ref 4–15)
NEUTROPHILS # BLD AUTO: 5.5 K/UL (ref 1.8–7.7)
NEUTROPHILS NFR BLD: 60.1 % (ref 38–73)
NONHDLC SERPL-MCNC: 116 MG/DL
NRBC BLD-RTO: 0 /100 WBC
PLATELET # BLD AUTO: 208 K/UL (ref 150–450)
PMV BLD AUTO: 10.8 FL (ref 9.2–12.9)
POTASSIUM SERPL-SCNC: 3.7 MMOL/L (ref 3.5–5.1)
PROT SERPL-MCNC: 7.2 G/DL (ref 6–8.4)
RBC # BLD AUTO: 4.5 M/UL (ref 4.6–6.2)
SODIUM SERPL-SCNC: 139 MMOL/L (ref 136–145)
TRIGL SERPL-MCNC: 45 MG/DL (ref 30–150)
TSH SERPL DL<=0.005 MIU/L-ACNC: 0.82 UIU/ML (ref 0.4–4)
WBC # BLD AUTO: 9.16 K/UL (ref 3.9–12.7)

## 2022-07-26 PROCEDURE — 85025 COMPLETE CBC W/AUTO DIFF WBC: CPT

## 2022-07-26 PROCEDURE — 80061 LIPID PANEL: CPT

## 2022-07-26 PROCEDURE — 36415 COLL VENOUS BLD VENIPUNCTURE: CPT

## 2022-07-26 PROCEDURE — 83036 HEMOGLOBIN GLYCOSYLATED A1C: CPT

## 2022-07-26 PROCEDURE — 80053 COMPREHEN METABOLIC PANEL: CPT

## 2022-07-26 PROCEDURE — 84443 ASSAY THYROID STIM HORMONE: CPT

## 2022-08-01 DIAGNOSIS — G47.00 INSOMNIA, UNSPECIFIED TYPE: ICD-10-CM

## 2022-08-07 ENCOUNTER — HOSPITAL ENCOUNTER (EMERGENCY)
Facility: HOSPITAL | Age: 32
Discharge: HOME | End: 2022-08-07
Attending: EMERGENCY MEDICINE
Payer: MEDICAID

## 2022-08-07 VITALS
HEART RATE: 86 BPM | TEMPERATURE: 98 F | SYSTOLIC BLOOD PRESSURE: 115 MMHG | RESPIRATION RATE: 15 BRPM | DIASTOLIC BLOOD PRESSURE: 57 MMHG | OXYGEN SATURATION: 99 %

## 2022-08-07 DIAGNOSIS — R13.10 DYSPHAGIA: ICD-10-CM

## 2022-08-07 DIAGNOSIS — S27.818A ESOPHAGEAL ABRASION, INITIAL ENCOUNTER: Primary | ICD-10-CM

## 2022-08-07 PROCEDURE — 99283 EMERGENCY DEPT VISIT LOW MDM: CPT | Mod: 25

## 2022-08-07 NOTE — DISCHARGE INSTRUCTIONS
There was no seed noted in your throat.  You likely have an abrasion to your throat/esophagus that makes it feel like the seed is stuck.      Please continue increased fluid intake.  You may use over-the-counter cough drops and throat sprays to help with comfort.     Return to the ED if your condition changes, progresses, or if you have any concerns.      Thank you for choosing [facility]! We appreciate you coming to us for your medical care. We hope you feel better soon! Please come back to [facility] for all of your future medical needs.      Our goal in the emergency department is to always give you outstanding care and exceptional service. You may receive a survey by mail or e-mail in the next week regarding your experience in our ED. We would greatly appreciate your completing and returning the survey. Your feedback provides us with a way to recognize our staff who give very good care and it helps us learn how to improve when your experience was below our aspiration of excellence.      Sincerely,  MARTINA Camarena  [facility]

## 2022-08-07 NOTE — ED PROVIDER NOTES
"Encounter Date: 8/7/2022    SCRIBE #1 NOTE: I, Hobartmax Irwin, am scribing for, and in the presence of, Mili Patton.       History     Chief Complaint   Patient presents with    food bolus     Pt presents to ED today reports orange seed stuck in throat x 20 mins. Pt reports attempting to use mirilax with no relief   Airway patent      Lior Nguyen III is a 32 y.o. male who  has a past medical history of Anxiety.    The patient presents to the ED due to throat irritation.   Patient reports that he feels like an orange seed got stuck in the bottom of his throat just prior to arrival. He states that he has frequent anxiety attacks that he remedies with citrus fruit. History of a seed stuck in throat sensation after fruit consumption. Patient is able to drink and eat without issue. The patient has tried miralax without improvement. Has never required GI intervention for food bolus impaction.  Pt states that after "calming down" he usually feels better and realizes he does not have a seed stuck in his throat.  Denies vomiting, dyspnea, and dysphagia.  No other complaints at this time.     The history is provided by the patient.     Review of patient's allergies indicates:  No Known Allergies  Past Medical History:   Diagnosis Date    Anxiety      No past surgical history on file.  Family History   Problem Relation Age of Onset    No Known Problems Mother     No Known Problems Father      Social History     Tobacco Use    Smoking status: Current Every Day Smoker     Packs/day: 0.50     Types: Vaping with nicotine    Smokeless tobacco: Never Used   Substance Use Topics    Alcohol use: Yes     Alcohol/week: 2.0 standard drinks     Types: 2 Glasses of wine per week    Drug use: Never     Review of Systems   Constitutional: Negative for activity change, appetite change, fatigue and fever.   HENT: Positive for sore throat (foreign body sensation). Negative for congestion, trouble swallowing and voice " change.         Positive for throat irritation.   Respiratory: Negative for cough and shortness of breath.    Cardiovascular: Negative for chest pain.   Gastrointestinal: Negative for abdominal pain, diarrhea, nausea and vomiting.   Musculoskeletal: Negative for back pain, joint swelling, myalgias and neck pain.   Skin: Negative.    Neurological: Negative for weakness and headaches.   Psychiatric/Behavioral: Negative for confusion.   All other systems reviewed and are negative.      Physical Exam     Initial Vitals [08/07/22 1244]   BP Pulse Resp Temp SpO2   (!) 115/57 88 19 98.2 °F (36.8 °C) 98 %      MAP       --         Physical Exam    Nursing note and vitals reviewed.  Constitutional: Vital signs are normal. He appears well-developed and well-nourished. He is active and cooperative. He is easily aroused.  Non-toxic appearance. He does not have a sickly appearance. He does not appear ill. No distress.   HENT:   Head: Normocephalic and atraumatic.   Nose: Nose normal.   Mouth/Throat: Uvula is midline, oropharynx is clear and moist and mucous membranes are normal.   Eyes: Conjunctivae are normal.   Neck: Trachea normal and phonation normal. Neck supple.   Voice normal. Managing secretions without difficulty. Drinking water.    Normal range of motion.   Full passive range of motion without pain.     Cardiovascular: Normal rate, regular rhythm and normal heart sounds.   Pulmonary/Chest: Effort normal and breath sounds normal.   Abdominal: Abdomen is soft. Bowel sounds are normal. He exhibits no distension. There is no abdominal tenderness. There is no rigidity, no rebound and no guarding.   Musculoskeletal:      Cervical back: Full passive range of motion without pain, normal range of motion and neck supple.     Neurological: He is alert, oriented to person, place, and time and easily aroused. He has normal strength. Coordination normal. GCS eye subscore is 4. GCS verbal subscore is 5. GCS motor subscore is 6.    Skin: Skin is warm, dry and intact. No bruising and no rash noted.   Psychiatric: His speech is normal and behavior is normal. Judgment and thought content normal. His mood appears anxious. Cognition and memory are normal.         ED Course   Procedures  Labs Reviewed - No data to display       Imaging Results          X-Ray Neck Soft Tissue (Final result)  Result time 08/07/22 13:41:47    Final result by Andres Arzate MD (08/07/22 13:41:47)                 Impression:      1. No acute radiographic abnormality of the soft tissues of the neck, no visualized radiopaque foreign body.      Electronically signed by: Andres Arzate MD  Date:    08/07/2022  Time:    13:41             Narrative:    EXAMINATION:  XR NECK SOFT TISSUE    CLINICAL HISTORY:  Dysphagia, unspecified    TECHNIQUE:  AP and lateral soft tissue views the neck were performed.    COMPARISON:  None.    FINDINGS:  Two views soft tissues neck.    Lateral imaging demonstrates adequate alignment of the cervical spine without significant vertebral body height loss or disc space height loss.  The facet joints are aligned.  No acute displaced fracture or dislocation of the cervical spine.  AP spinal alignment is unremarkable.  The airway is patent.  The pulmonary apices are clear.  The paraspinous and hypopharyngeal soft tissues are unremarkable.  No epiglottic enlargement.  No tonsillar enlargement.                                 Medications - No data to display  Medical Decision Making:   Initial Assessment:   31yo male here due to feeling like an orange seed is stuck in his throat for the past 20 minutes. He reports episodes of similar symptoms due to anxiety.  Pt is tolerating oral fluids without difficulty. Denies dyspnea, dysphagia, and voice change. No Vomiting. Pt is well-appearing, no distress. He is able to drink water.   Differential Diagnosis:   Esophageal abrasion, esophageal foreign body, anxiety  Clinical Tests:   Radiological Study:  Ordered and Reviewed  ED Management:  Xray is negative for noted foreign body. He is tolerating fluids and food without difficulty.  Pt reported upon discharge that his symptoms have resolved and he feels comfortable with DC.       Pt to follow up with PCP as needed.  I reviewed strict return precautions. In addition, pt is to return to the ED if condition changes, progresses, or if there are any concerns.  Pt verbalized understanding, compliance, and agreement with the treatment plan.                          Clinical Impression:   Final diagnoses:  [R13.10] Dysphagia  [S27.978A] Esophageal abrasion, initial encounter (Primary)          ED Disposition Condition    Discharge Stable        ED Prescriptions     None        Follow-up Information     Follow up With Specialties Details Why Contact Info    John Villalobos MD Family Medicine Schedule an appointment as soon as possible for a visit  As needed [address], [facility] [address]  [facility] [id number] [phone]             I, Mili AYALA, personally performed the services described in this documentation. All medical record entries made by the scribe were at my direction and in my presence.  I have reviewed the chart and agree that the record reflects my personal performance and is accurate and complete.     MARTINA Camarena  2:11 PM 08/07/2022       LUCY Mejia  08/07/22 1410

## 2022-08-07 NOTE — ED NOTES
APPEARANCE: Awake, alert, & oriented. No acute distress.  CARDIAC: Normal rate and rhythm. Denies chest pain.     RESPIRATORY: Respirations are even and unlabored no obvious signs of distress. No accessory muscle use. Breath sounds clear bilaterally throughout chest.  PERIPHERAL VASCULAR: peripheral pulses present. Normal cap refill. No edema.   GASTRO: soft, no tenderness, no abdominal distention.  MUSC: Full ROM. No bony tenderness or soft tissue tenderness. No obvious deformity.  SKIN: Skin is warm, dry, and intact. Normal skin turgor and color.  NEURO: Equal strength bilaterally. Nino coma scale: Eye Response-4, Motor Response-6, Verbal Response-5. Total=15. Clear speech. No neurological abnormalities.   EENT: No c/o vision or hearing difficulties. Oropharynx clear.

## 2022-08-10 ENCOUNTER — HOSPITAL ENCOUNTER (EMERGENCY)
Facility: HOSPITAL | Age: 32
Discharge: HOME | End: 2022-08-11
Attending: EMERGENCY MEDICINE
Payer: MEDICAID

## 2022-08-10 DIAGNOSIS — Z00.8 ENCOUNTER FOR MEDICAL ASSESSMENT: Primary | ICD-10-CM

## 2022-08-10 PROCEDURE — 99281 EMR DPT VST MAYX REQ PHY/QHP: CPT

## 2022-08-11 ENCOUNTER — PATIENT OUTREACH (OUTPATIENT)
Dept: EMERGENCY MEDICINE | Facility: HOSPITAL | Age: 32
End: 2022-08-11
Payer: MEDICAID

## 2022-08-11 VITALS
HEART RATE: 78 BPM | OXYGEN SATURATION: 100 % | TEMPERATURE: 98 F | RESPIRATION RATE: 18 BRPM | WEIGHT: 146 LBS | DIASTOLIC BLOOD PRESSURE: 73 MMHG | BODY MASS INDEX: 18.75 KG/M2 | SYSTOLIC BLOOD PRESSURE: 122 MMHG

## 2022-08-11 NOTE — ED PROVIDER NOTES
Encounter Date: 8/10/2022       History     Chief Complaint   Patient presents with    Medication Reaction     Patient states around 9 pm he took one dose of Tramadol (50 mg) and one dose of Hydroxyzine (25 mg). Reports back pain and anxiety. States this was the first time taking 2 medications together. Admits to drinking alcohol with medicine. Patient states around 9:30, while eating dinner, patient felt overly drowsy. Denies trouble breathing. Only presents tired.      HPI   32-year-old male with history of insomnia, anxiety presenting after taking tramadol, hydroxyzine, and alcohol, feeling drowsy  Patient did not purposely mix the medications, no intention of self-harm  Denies f/c/n/v/CP/SOB/abd pain/urinary sxs/diarrhea.    Review of patient's allergies indicates:  No Known Allergies  Past Medical History:   Diagnosis Date    Anxiety      No past surgical history on file.  Family History   Problem Relation Age of Onset    No Known Problems Mother     No Known Problems Father      Social History     Tobacco Use    Smoking status: Current Every Day Smoker     Packs/day: 0.50     Types: Vaping with nicotine    Smokeless tobacco: Never Used   Substance Use Topics    Alcohol use: Yes     Alcohol/week: 2.0 standard drinks     Types: 2 Glasses of wine per week    Drug use: Never     Review of Systems   Constitutional: Positive for fatigue. Negative for appetite change, chills, diaphoresis and fever.   HENT: Negative for congestion, nosebleeds, sore throat, trouble swallowing and voice change.    Eyes: Negative for photophobia, pain, redness and itching.   Respiratory: Negative for cough, chest tightness and shortness of breath.    Cardiovascular: Negative for chest pain and leg swelling.   Gastrointestinal: Negative for abdominal pain, constipation, diarrhea, nausea and vomiting.   Genitourinary: Negative for decreased urine volume, difficulty urinating, dysuria and frequency.   Musculoskeletal: Negative for  gait problem.   Skin: Negative for color change, rash and wound.   Neurological: Negative for dizziness, facial asymmetry, speech difficulty and headaches.   Psychiatric/Behavioral: Negative for agitation, confusion and suicidal ideas. The patient is nervous/anxious.    All other systems reviewed and are negative.      Physical Exam     Initial Vitals [08/10/22 2157]   BP Pulse Resp Temp SpO2   116/63 77 18 98.4 °F (36.9 °C) 100 %      MAP       --         Physical Exam    Nursing note and vitals reviewed.  Constitutional:   EXAM  General: Awake, alert and oriented. No acute distress.     Head: normocephalic and atraumatic     Eyes: Conjunctivae are clear without exudates or hemorrhage. Sclera is non-icteric. EOM are intact. Eyelids are normal in appearance without swelling or lesions.     Ears: The external ear and ear canal are non-tender and without swelling. The canal is clear without discharge. Hearing intact.     Nose: Nares are patent bilaterally.     Neck: The neck is supple. Trachea is midline. Full ROM.     Cardiac: Regular rate.     Respiratory: No signs of respiratory distress. No audible wheezes.     Abdominal: Non-distended.     Extremities: Upper and lower extremities are atraumatic in appearance without tenderness or deformity. No swelling or erythema. Full range of motion.     Skin: Appropriate color for ethnicity.     Neurological: The patient is awake, alert and oriented to person, place, and time with normal speech.     Psychiatric: Appropriate mood and affect.     In light of current/ongoing global covid-19 pandemic, all my encounters w pt were with full ppe including but not limited to gown, gloves, n95, eye protection OR from >6 ft away.             ED Course   Procedures  Labs Reviewed - No data to display       Imaging Results    None          Medications - No data to display  Medical Decision Making:   ED Management:  Very pleasant patient is well-appearing with reassuring vital signs.   Counseled on not mixing medications together and not drinking alcohol with tramadol nor hydroxyzine.  Patient not actively trying to harm self.  Strict return precautions discussed with patient expressing understanding of instructions, and all questions answered.                         Clinical Impression:   Final diagnoses:  [Z00.8] Encounter for medical assessment (Primary)          ED Disposition Condition    Discharge Stable        ED Prescriptions     None        Follow-up Information    None          Coleen Mojica MD  08/11/22 0019

## 2022-08-11 NOTE — FIRST PROVIDER EVALUATION
Emergency Department TeleTriage Encounter Note      CHIEF COMPLAINT    Chief Complaint   Patient presents with    Medication Reaction     Patient states around 9 pm he took one dose of Tramadol (50 mg) and one dose of Hydroxyzine (25 mg). Reports back pain and anxiety. States this was the first time taking 2 medications together. Admits to drinking alcohol with medicine. Patient states around 9:30, while eating dinner, patient felt overly drowsy. Denies trouble breathing. Only presents tired.        VITAL SIGNS   Initial Vitals [08/10/22 2157]   BP Pulse Resp Temp SpO2   116/63 77 18 98.4 °F (36.9 °C) 100 %      MAP       --            ALLERGIES    Review of patient's allergies indicates:  No Known Allergies    PROVIDER TRIAGE NOTE  This is a teletriage evaluation of a 32 y.o. male presenting to the ED with c/o sleepiness and trouble breathing after taking tramadol, hydroxyzine and drinking alcohol today. No facial swelling, trouble swallowing or issues walking.    PE: appears intoxicated. Non-toxic/well-appearing. No respiratory distress, speaks in full sentences without issue. No active emesis nor cough. Normal eye contact and mentation.     Plan: 100% at rest, ambulatory pulse ox. Further/augmented workup at discretion of examining provider.     All ED beds are full at present; patient notified of this status.  Patient seen and medically screened by [facility] via teletriage. Orders initiated at triage to expedite care.  Patient is stable and will be placed in an ED bed when available.  Care will be transferred to an alternate provider when patient has been placed in an Exam Room further exam, additional orders, and disposition.         ORDERS  Labs Reviewed - No data to display    ED Orders (720h ago, onward)    Start Ordered     Status Ordering Provider    08/10/22 2247 08/10/22 2246  Ambulate  Once         Ordered SEBAS HOLBROOK            Virtual Visit Note: The provider triage portion of this emergency  department evaluation and documentation was performed via [employer], a HIPAA-compliant telemedicine application, in concert with a tele-presenter in the room. A face to face patient evaluation with one of my colleagues will occur once the patient is placed in an emergency department room.      DISCLAIMER: This note was prepared with [employer] voice recognition transcription software. Garbled syntax, mangled pronouns, and other bizarre constructions may be attributed to that software system.

## 2022-08-11 NOTE — ED NOTES
Pt given d/c instructions with correct return verbalization of understanding. Pt is alert, oriented x4 and in no distress prior to departing ER. Normal respiratory drive noted.

## 2022-08-11 NOTE — PROGRESS NOTES
Millie Oropeza  ED Navigator  Emergency Department    Project: [facility] [place] [facility] ED Nav[facility]  Role: Community Health Worker    Date: 08/11/2022  Patient Name: Lior Nguyen III  MRN: [id number]  PCP: John Rivera MD    Assessment:     Lior Nguyen III is a 32 y.o. male who has presented to ED for medication reaction. Patient has visited the ED 2 times in the past 3 months. Patient did not contact PCP.     ED Navigator Initial Assessment    ED Navigator Enrollment Documentation  Consent to Services  Does patient consent to completing the assessment?: Yes  Contact  Method of Initial Contact: Phone  Transportation  Does the patient have issues with Transportation?: No  Does the patient have transportation to and from healthcare appointments?: Yes  Insurance Coverage  Do you have coverage/adequate coverage?: Yes  Type/kind of coverage: MEDICAID [facility] COMMUNITY PLAN  Is patient able to afford co-pays/deductibles?: Yes  Is patient able to afford HME or supplies?: Yes  Does patient have an established [facility] PCP?: No  Does patient need assistance finding a PCP?: Yes  Does the patient have a lack of adequate coverage?: No  Specialist Appointment  Did the patient come to the ED to see a specialist?: No  Does the patient have a pending specialist referral?: No  Does the patient have a specialist appointment made?: No  PCP Follow Up Appointment  Has the patient had an appointment with a primary care provider in the past year?: Yes  Approximate date: 7/21/22  Provider: John Villalobos MD  Does the patient have a follow up appontment with a PCP?: Yes  Upcoming appointment date: 8/16/22  Provider:  (Comment: [facility])  When was the last time you saw your PCP?: 7/21/22  Medications  Is patient able to afford medication?: Yes  Is patient unable to get medication due to lack of transportation?: No  Psychological  Does the patient have psycho-social concerns?: No  Food  Does the patient have concerns  about food?: No  Communication/Education  Does the patient have limited English proficiency/English not primary language?: No  Does patient have low literacy and/or low health literacy?: No  Does patient have concerns with care?: No  Does patient have dissatisfaction with care?: No  Other Financial Concerns  Does the patient have immediate financial distress?: No  Does the patient have general financial concerns?: No  Other Social Barriers/Concerns  Does the patient have any additional barriers or concerns?: Work  Primary Barrier  Barriers identified: Structural barrier (service availability, waiting times, etc.)  Root Cause of ED Utilization: Lack of Access to Primary Care  Plan to address Lack of Access to Primary Care: Provided patient with information about the [facility] in their area, Provided [facility] PCP assistance line [phone], Provided information for [facility] ([facility] - [facility], [facility], etc[facility] Provided information for [facility] On [facility] 24/7 Nurse Triage line [phone] or 1-866-[facility] ([phone]), Provided information on COVID-19 resources and hotline ([phone])  Next steps: Scheduled Appointment/Referral, Provided Education  Scheduled Appointment Date: 8/16/22  Appointment Reminder Date: 8/15/22  Was education/educational materials provided surrounding PCP services/creating a medical home?: Yes Was education verbal or written?: Verbal   Was education/educational materials provided surrounding low cost, healthy foods?: No    Was education/educational materials provided surrounding other items? If so, use comment to explain.: No    Plan: Provided information for [facility] 24/7 Nurse triage line, [phone] or 1-[facility] ([phone])  Expected Date of Follow Up 1: 9/1/22  Additional Documentation: Spoke with patient that presented to the ED for medication reaction. Patient stated he  was doing fine. Patient was asked if he had a PCP to follow up with he stated yes and no as he had seen a provider but was not pleased and would like to fine another. Patient has been scheduled for 8/16/2022 @ 10am at [facility]. Patient was also educated on stress management. Patient denied needing any other assistance at this time.         Social History     Socioeconomic History    Marital status:    Tobacco Use    Smoking status: Current Every Day Smoker     Packs/day: 0.50     Types: Vaping with nicotine    Smokeless tobacco: Never Used   Substance and Sexual Activity    Alcohol use: Yes     Alcohol/week: 2.0 standard drinks     Types: 2 Glasses of wine per week    Drug use: Never    Sexual activity: Yes     Partners: Female     Social Determinants of Health     Financial Resource Strain: Low Risk     Difficulty of Paying Living Expenses: Not very hard   Food Insecurity: No Food Insecurity    Worried About Running Out of Food in the Last Year: Never true    Ran Out of Food in the Last Year: Never true   Transportation Needs: No Transportation Needs    Lack of Transportation (Medical): No    Lack of Transportation (Non-Medical): No   Physical Activity: Sufficiently Active    Days of Exercise per Week: 5 days    Minutes of Exercise per Session: 60 min   Stress: Stress Concern Present    Feeling of Stress : To some extent   Social Connections: Moderately Isolated    Frequency of Communication with Friends and Family: More than three times a week    Frequency of Social Gatherings with Friends and Family: More than three times a week    Attends Confucianist Services: Never    Active Member of Clubs or Organizations: No    Attends Club or Organization Meetings: Never    Marital Status:    Housing Stability: Unknown    Unable to Pay for Housing in the Last Year: No    Unstable Housing in the Last Year: No       Plan:     Spoke with patient that presented to the ED  for medication reaction. Patient stated he was doing fine. Patient was asked if he had a PCP to follow up with he stated yes and no as he had seen a provider but was not pleased and would like to fine another. Patient has been scheduled for 8/16/2022 @ 10am at [facility]. Patient was also educated on stress management. Patient denied needing any other assistance at this time.      Appointment made with: John Rivera MD

## 2022-08-15 ENCOUNTER — PATIENT OUTREACH (OUTPATIENT)
Dept: EMERGENCY MEDICINE | Facility: HOSPITAL | Age: 32
End: 2022-08-15
Payer: MEDICAID

## 2022-08-19 RX ORDER — QUETIAPINE FUMARATE 25 MG/1
25 TABLET, FILM COATED ORAL NIGHTLY
Qty: 30 TABLET | Refills: 11 | Status: ON HOLD | OUTPATIENT
Start: 2022-08-19 | End: 2023-01-11

## 2022-09-01 ENCOUNTER — PATIENT OUTREACH (OUTPATIENT)
Dept: EMERGENCY MEDICINE | Facility: HOSPITAL | Age: 32
End: 2022-09-01
Payer: MEDICAID

## 2022-09-29 ENCOUNTER — PATIENT OUTREACH (OUTPATIENT)
Dept: EMERGENCY MEDICINE | Facility: HOSPITAL | Age: 32
End: 2022-09-29
Payer: MEDICAID

## 2022-12-26 ENCOUNTER — HOSPITAL ENCOUNTER (EMERGENCY)
Facility: HOSPITAL | Age: 32
Discharge: PSYCHIATRIC OR SUBSTANCE USE FACILITY | End: 2022-12-26
Attending: EMERGENCY MEDICINE
Payer: MEDICAID

## 2022-12-26 VITALS
HEIGHT: 74 IN | BODY MASS INDEX: 18.74 KG/M2 | RESPIRATION RATE: 18 BRPM | TEMPERATURE: 98 F | DIASTOLIC BLOOD PRESSURE: 84 MMHG | SYSTOLIC BLOOD PRESSURE: 131 MMHG | HEART RATE: 65 BPM | OXYGEN SATURATION: 100 % | WEIGHT: 146 LBS

## 2022-12-26 DIAGNOSIS — R45.850 HOMICIDAL IDEATION: Primary | ICD-10-CM

## 2022-12-26 DIAGNOSIS — Z00.8 MEDICAL CLEARANCE FOR PSYCHIATRIC ADMISSION: ICD-10-CM

## 2022-12-26 LAB
ALBUMIN SERPL BCP-MCNC: 4.5 G/DL (ref 3.5–5.2)
ALP SERPL-CCNC: 125 U/L (ref 55–135)
ALT SERPL W/O P-5'-P-CCNC: 21 U/L (ref 10–44)
AMPHET+METHAMPHET UR QL: NEGATIVE
ANION GAP SERPL CALC-SCNC: 9 MMOL/L (ref 8–16)
APAP SERPL-MCNC: <3 UG/ML (ref 10–20)
AST SERPL-CCNC: 19 U/L (ref 10–40)
BARBITURATES UR QL SCN>200 NG/ML: NEGATIVE
BASOPHILS # BLD AUTO: 0.06 K/UL (ref 0–0.2)
BASOPHILS NFR BLD: 0.7 % (ref 0–1.9)
BENZODIAZ UR QL SCN>200 NG/ML: NEGATIVE
BILIRUB SERPL-MCNC: 0.4 MG/DL (ref 0.1–1)
BILIRUB UR QL STRIP: NEGATIVE
BUN SERPL-MCNC: 17 MG/DL (ref 6–20)
BZE UR QL SCN: NEGATIVE
CALCIUM SERPL-MCNC: 9.1 MG/DL (ref 8.7–10.5)
CANNABINOIDS UR QL SCN: ABNORMAL
CHLORIDE SERPL-SCNC: 109 MMOL/L (ref 95–110)
CLARITY UR: CLEAR
CO2 SERPL-SCNC: 25 MMOL/L (ref 23–29)
COLOR UR: COLORLESS
CREAT SERPL-MCNC: 0.9 MG/DL (ref 0.5–1.4)
CREAT UR-MCNC: 36.7 MG/DL (ref 23–375)
DIFFERENTIAL METHOD: ABNORMAL
EOSINOPHIL # BLD AUTO: 0.2 K/UL (ref 0–0.5)
EOSINOPHIL NFR BLD: 1.7 % (ref 0–8)
ERYTHROCYTE [DISTWIDTH] IN BLOOD BY AUTOMATED COUNT: 11.9 % (ref 11.5–14.5)
EST. GFR  (NO RACE VARIABLE): >60 ML/MIN/1.73 M^2
ETHANOL SERPL-MCNC: 83 MG/DL
GLUCOSE SERPL-MCNC: 72 MG/DL (ref 70–110)
GLUCOSE UR QL STRIP: NEGATIVE
HCT VFR BLD AUTO: 41.9 % (ref 40–54)
HGB BLD-MCNC: 13.1 G/DL (ref 14–18)
HGB UR QL STRIP: NEGATIVE
IMM GRANULOCYTES # BLD AUTO: 0.02 K/UL (ref 0–0.04)
IMM GRANULOCYTES NFR BLD AUTO: 0.2 % (ref 0–0.5)
KETONES UR QL STRIP: NEGATIVE
LEUKOCYTE ESTERASE UR QL STRIP: NEGATIVE
LYMPHOCYTES # BLD AUTO: 2.4 K/UL (ref 1–4.8)
LYMPHOCYTES NFR BLD: 26.8 % (ref 18–48)
MCH RBC QN AUTO: 29 PG (ref 27–31)
MCHC RBC AUTO-ENTMCNC: 31.3 G/DL (ref 32–36)
MCV RBC AUTO: 93 FL (ref 82–98)
METHADONE UR QL SCN>300 NG/ML: NEGATIVE
MONOCYTES # BLD AUTO: 0.5 K/UL (ref 0.3–1)
MONOCYTES NFR BLD: 6 % (ref 4–15)
NEUTROPHILS # BLD AUTO: 5.7 K/UL (ref 1.8–7.7)
NEUTROPHILS NFR BLD: 64.6 % (ref 38–73)
NITRITE UR QL STRIP: NEGATIVE
NRBC BLD-RTO: 0 /100 WBC
OPIATES UR QL SCN: NEGATIVE
PCP UR QL SCN>25 NG/ML: NEGATIVE
PH UR STRIP: 6 [PH] (ref 5–8)
PLATELET # BLD AUTO: 227 K/UL (ref 150–450)
PMV BLD AUTO: 9.9 FL (ref 9.2–12.9)
POTASSIUM SERPL-SCNC: 4.2 MMOL/L (ref 3.5–5.1)
PROT SERPL-MCNC: 7.5 G/DL (ref 6–8.4)
PROT UR QL STRIP: NEGATIVE
RBC # BLD AUTO: 4.51 M/UL (ref 4.6–6.2)
SARS-COV-2 RDRP RESP QL NAA+PROBE: NEGATIVE
SODIUM SERPL-SCNC: 143 MMOL/L (ref 136–145)
SP GR UR STRIP: 1.01 (ref 1–1.03)
TOXICOLOGY INFORMATION: ABNORMAL
TSH SERPL DL<=0.005 MIU/L-ACNC: 0.89 UIU/ML (ref 0.4–4)
URN SPEC COLLECT METH UR: ABNORMAL
UROBILINOGEN UR STRIP-ACNC: NEGATIVE EU/DL
WBC # BLD AUTO: 8.82 K/UL (ref 3.9–12.7)

## 2022-12-26 PROCEDURE — 81003 URINALYSIS AUTO W/O SCOPE: CPT | Mod: 59 | Performed by: EMERGENCY MEDICINE

## 2022-12-26 PROCEDURE — 85025 COMPLETE CBC W/AUTO DIFF WBC: CPT | Performed by: EMERGENCY MEDICINE

## 2022-12-26 PROCEDURE — 99285 EMERGENCY DEPT VISIT HI MDM: CPT

## 2022-12-26 PROCEDURE — 80307 DRUG TEST PRSMV CHEM ANLYZR: CPT | Performed by: EMERGENCY MEDICINE

## 2022-12-26 PROCEDURE — 80053 COMPREHEN METABOLIC PANEL: CPT | Performed by: EMERGENCY MEDICINE

## 2022-12-26 PROCEDURE — U0002 COVID-19 LAB TEST NON-CDC: HCPCS | Performed by: EMERGENCY MEDICINE

## 2022-12-26 PROCEDURE — 80143 DRUG ASSAY ACETAMINOPHEN: CPT | Performed by: EMERGENCY MEDICINE

## 2022-12-26 PROCEDURE — 25000003 PHARM REV CODE 250: Performed by: EMERGENCY MEDICINE

## 2022-12-26 PROCEDURE — 84443 ASSAY THYROID STIM HORMONE: CPT | Performed by: EMERGENCY MEDICINE

## 2022-12-26 PROCEDURE — 82077 ASSAY SPEC XCP UR&BREATH IA: CPT | Performed by: EMERGENCY MEDICINE

## 2022-12-26 RX ORDER — SERTRALINE HYDROCHLORIDE 100 MG/1
100 TABLET, FILM COATED ORAL DAILY
Status: DISCONTINUED | OUTPATIENT
Start: 2022-12-26 | End: 2022-12-26 | Stop reason: HOSPADM

## 2022-12-26 RX ADMIN — SERTRALINE HYDROCHLORIDE 100 MG: 100 TABLET ORAL at 09:12
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
## 2022-12-26 NOTE — ED NOTES
Patient transferred to [facility]. Belongings, ED record, and PEC order given to  (EMS). Patient is in stable condition. Number to facility given to patient's wife. MICHA CANADA.

## 2022-12-26 NOTE — ED NOTES
Report called to Shauna @ [facility]. Patient ambulated to bathroom with sitter, steady gait noted.

## 2022-12-26 NOTE — ED NOTES
PEC form has been scanned into the patients EMR. PEC has been called in and faxed to the [facility] 's office.

## 2022-12-26 NOTE — ED NOTES
Patient received 100 mg Zoloft after requesting that he be given his medication. Patient sitting up in bed speaking with wife @ bedside. NILO

## 2022-12-26 NOTE — ED NOTES
Patient resting comfortably, appears asleep, respirations even and unlabored, NADN. Patient has visitor @ bedside. Sitter @ bedside.

## 2022-12-31 PROBLEM — F12.10 CANNABIS ABUSE: Status: ACTIVE | Noted: 2022-12-31

## 2022-12-31 PROBLEM — F33.2 MAJOR DEPRESSIVE DISORDER, RECURRENT SEVERE WITHOUT PSYCHOTIC FEATURES: Status: ACTIVE | Noted: 2022-12-31

## 2022-12-31 PROBLEM — Z72.0 NICOTINE ABUSE: Status: ACTIVE | Noted: 2022-12-31

## 2023-01-02 ENCOUNTER — HOSPITAL ENCOUNTER (EMERGENCY)
Facility: HOSPITAL | Age: 33
Discharge: PSYCHIATRIC OR SUBSTANCE USE FACILITY | End: 2023-01-03
Attending: EMERGENCY MEDICINE
Payer: MEDICAID

## 2023-01-02 DIAGNOSIS — R00.0 TACHYCARDIA: ICD-10-CM

## 2023-01-02 DIAGNOSIS — R46.89 BEHAVIOR CONCERN: Primary | ICD-10-CM

## 2023-01-02 LAB
ALBUMIN SERPL BCP-MCNC: 4.4 G/DL (ref 3.5–5.2)
ALP SERPL-CCNC: 120 U/L (ref 55–135)
ALT SERPL W/O P-5'-P-CCNC: 27 U/L (ref 10–44)
AMPHET+METHAMPHET UR QL: NEGATIVE
ANION GAP SERPL CALC-SCNC: 9 MMOL/L (ref 8–16)
APAP SERPL-MCNC: <3 UG/ML (ref 10–20)
AST SERPL-CCNC: 25 U/L (ref 10–40)
BARBITURATES UR QL SCN>200 NG/ML: NEGATIVE
BASOPHILS # BLD AUTO: 0.07 K/UL (ref 0–0.2)
BASOPHILS NFR BLD: 0.5 % (ref 0–1.9)
BENZODIAZ UR QL SCN>200 NG/ML: NEGATIVE
BILIRUB SERPL-MCNC: 0.7 MG/DL (ref 0.1–1)
BILIRUB UR QL STRIP: NEGATIVE
BUN SERPL-MCNC: 21 MG/DL (ref 6–20)
BZE UR QL SCN: NEGATIVE
CALCIUM SERPL-MCNC: 9.4 MG/DL (ref 8.7–10.5)
CANNABINOIDS UR QL SCN: ABNORMAL
CHLORIDE SERPL-SCNC: 104 MMOL/L (ref 95–110)
CLARITY UR: CLEAR
CO2 SERPL-SCNC: 24 MMOL/L (ref 23–29)
COLOR UR: YELLOW
CREAT SERPL-MCNC: 1.1 MG/DL (ref 0.5–1.4)
CREAT UR-MCNC: 152.7 MG/DL (ref 23–375)
CTP QC/QA: YES
DIFFERENTIAL METHOD: ABNORMAL
EOSINOPHIL # BLD AUTO: 0.1 K/UL (ref 0–0.5)
EOSINOPHIL NFR BLD: 0.5 % (ref 0–8)
ERYTHROCYTE [DISTWIDTH] IN BLOOD BY AUTOMATED COUNT: 11.7 % (ref 11.5–14.5)
EST. GFR  (NO RACE VARIABLE): >60 ML/MIN/1.73 M^2
ETHANOL SERPL-MCNC: <10 MG/DL
GLUCOSE SERPL-MCNC: 92 MG/DL (ref 70–110)
GLUCOSE UR QL STRIP: NEGATIVE
HCT VFR BLD AUTO: 38.9 % (ref 40–54)
HGB BLD-MCNC: 12.6 G/DL (ref 14–18)
HGB UR QL STRIP: NEGATIVE
IMM GRANULOCYTES # BLD AUTO: 0.05 K/UL (ref 0–0.04)
IMM GRANULOCYTES NFR BLD AUTO: 0.4 % (ref 0–0.5)
KETONES UR QL STRIP: NEGATIVE
LEUKOCYTE ESTERASE UR QL STRIP: NEGATIVE
LYMPHOCYTES # BLD AUTO: 2.4 K/UL (ref 1–4.8)
LYMPHOCYTES NFR BLD: 16.6 % (ref 18–48)
MCH RBC QN AUTO: 28.7 PG (ref 27–31)
MCHC RBC AUTO-ENTMCNC: 32.4 G/DL (ref 32–36)
MCV RBC AUTO: 89 FL (ref 82–98)
METHADONE UR QL SCN>300 NG/ML: NEGATIVE
MONOCYTES # BLD AUTO: 1 K/UL (ref 0.3–1)
MONOCYTES NFR BLD: 6.9 % (ref 4–15)
NEUTROPHILS # BLD AUTO: 10.7 K/UL (ref 1.8–7.7)
NEUTROPHILS NFR BLD: 75.1 % (ref 38–73)
NITRITE UR QL STRIP: NEGATIVE
NRBC BLD-RTO: 0 /100 WBC
OPIATES UR QL SCN: NEGATIVE
PCP UR QL SCN>25 NG/ML: NEGATIVE
PH UR STRIP: 6 [PH] (ref 5–8)
PLATELET # BLD AUTO: 229 K/UL (ref 150–450)
PMV BLD AUTO: 9.9 FL (ref 9.2–12.9)
POTASSIUM SERPL-SCNC: 3.9 MMOL/L (ref 3.5–5.1)
PROT SERPL-MCNC: 7.2 G/DL (ref 6–8.4)
PROT UR QL STRIP: NEGATIVE
RBC # BLD AUTO: 4.39 M/UL (ref 4.6–6.2)
SALICYLATES SERPL-MCNC: <5 MG/DL (ref 15–30)
SARS-COV-2 RDRP RESP QL NAA+PROBE: NEGATIVE
SODIUM SERPL-SCNC: 137 MMOL/L (ref 136–145)
SP GR UR STRIP: 1.02 (ref 1–1.03)
TOXICOLOGY INFORMATION: ABNORMAL
TSH SERPL DL<=0.005 MIU/L-ACNC: 1.48 UIU/ML (ref 0.4–4)
URN SPEC COLLECT METH UR: NORMAL
UROBILINOGEN UR STRIP-ACNC: NEGATIVE EU/DL
WBC # BLD AUTO: 14.24 K/UL (ref 3.9–12.7)

## 2023-01-02 PROCEDURE — 80307 DRUG TEST PRSMV CHEM ANLYZR: CPT | Performed by: EMERGENCY MEDICINE

## 2023-01-02 PROCEDURE — 80179 DRUG ASSAY SALICYLATE: CPT | Performed by: EMERGENCY MEDICINE

## 2023-01-02 PROCEDURE — 84443 ASSAY THYROID STIM HORMONE: CPT | Performed by: EMERGENCY MEDICINE

## 2023-01-02 PROCEDURE — 82077 ASSAY SPEC XCP UR&BREATH IA: CPT | Performed by: EMERGENCY MEDICINE

## 2023-01-02 PROCEDURE — 80053 COMPREHEN METABOLIC PANEL: CPT | Performed by: EMERGENCY MEDICINE

## 2023-01-02 PROCEDURE — 87635 SARS-COV-2 COVID-19 AMP PRB: CPT | Performed by: EMERGENCY MEDICINE

## 2023-01-02 PROCEDURE — 99215 PR OFFICE/OUTPT VISIT, EST, LEVL V, 40-54 MIN: ICD-10-PCS | Mod: 95,AF,HB, | Performed by: PSYCHIATRY & NEUROLOGY

## 2023-01-02 PROCEDURE — 81003 URINALYSIS AUTO W/O SCOPE: CPT | Mod: 59 | Performed by: EMERGENCY MEDICINE

## 2023-01-02 PROCEDURE — 99285 EMERGENCY DEPT VISIT HI MDM: CPT

## 2023-01-02 PROCEDURE — 80143 DRUG ASSAY ACETAMINOPHEN: CPT | Performed by: EMERGENCY MEDICINE

## 2023-01-02 PROCEDURE — 85025 COMPLETE CBC W/AUTO DIFF WBC: CPT | Performed by: EMERGENCY MEDICINE

## 2023-01-02 PROCEDURE — 99215 OFFICE O/P EST HI 40 MIN: CPT | Mod: 95,AF,HB, | Performed by: PSYCHIATRY & NEUROLOGY

## 2023-01-03 VITALS
DIASTOLIC BLOOD PRESSURE: 81 MMHG | WEIGHT: 145 LBS | TEMPERATURE: 98 F | SYSTOLIC BLOOD PRESSURE: 133 MMHG | BODY MASS INDEX: 18.62 KG/M2 | RESPIRATION RATE: 20 BRPM | HEART RATE: 67 BPM | OXYGEN SATURATION: 99 %

## 2023-01-03 NOTE — ED PROVIDER NOTES
"Encounter Date: 1/2/2023       History     Chief Complaint   Patient presents with    Mental Health Problem     Pt arrived with JPSO under an OPC. Pt was recently discharged from [facility] and has not slept since returning home. Pt arrived calm and cooperative.      32-year-old male who arrives on an OPC from his wife who has concern because the patient has not slept in 5 days, she states he is not eating, he denies this, but has been "fidgety" and rearranging things in the household in a "bizarre way".  The patient tells me that the reason he is not sleeping is because he is preparing himself for when he has a baby.  Per the patient he does not have a child and his wife is not pregnant.  He was recently discharged from [facility] where he states they did adjust his medications like his Zoloft and hydroxyzine.    Review of patient's allergies indicates:  No Known Allergies  Past Medical History:   Diagnosis Date    Anxiety      No past surgical history on file.  Family History   Problem Relation Age of Onset    No Known Problems Mother     No Known Problems Father      Social History     Tobacco Use    Smoking status: Every Day     Packs/day: 0.50     Types: Vaping with nicotine, Cigarettes    Smokeless tobacco: Never   Substance Use Topics    Alcohol use: Yes     Alcohol/week: 2.0 standard drinks     Types: 2 Glasses of wine per week    Drug use: Never     Review of Systems   Psychiatric/Behavioral:          Patient denies both homicidal and suicidal ideations   All other systems reviewed and are negative.    Physical Exam     Initial Vitals [01/02/23 1907]   BP Pulse Resp Temp SpO2   (!) 146/78 (!) 118 20 99.6 °F (37.6 °C) 100 %      MAP       --         Physical Exam    Nursing note and vitals reviewed.  Constitutional:   Patient is very fidgety in the room, he is awake alert and oriented x3, he does not appear to be in any distress and he is cooperative.   HENT:   Head: Normocephalic and " atraumatic.   Cardiovascular:  Normal rate and regular rhythm.           No murmur heard.  Pulmonary/Chest: Breath sounds normal. He has no wheezes.   Abdominal: Abdomen is soft. There is no abdominal tenderness.   Musculoskeletal:         General: Normal range of motion.     Neurological: He is alert and oriented to person, place, and time.   Skin: Skin is warm and dry.   Psychiatric:   Patient is cooperative       ED Course   Procedures  Labs Reviewed   CBC W/ AUTO DIFFERENTIAL - Abnormal; Notable for the following components:       Result Value    WBC 14.24 (*)     RBC 4.39 (*)     Hemoglobin 12.6 (*)     Hematocrit 38.9 (*)     Gran # (ANC) 10.7 (*)     Immature Grans (Abs) 0.05 (*)     Gran % 75.1 (*)     Lymph % 16.6 (*)     All other components within normal limits   COMPREHENSIVE METABOLIC PANEL - Abnormal; Notable for the following components:    BUN 21 (*)     All other components within normal limits   DRUG SCREEN PANEL, URINE EMERGENCY - Abnormal; Notable for the following components:    THC Presumptive Positive (*)     All other components within normal limits    Narrative:     Specimen Source->Urine   ACETAMINOPHEN LEVEL - Abnormal; Notable for the following components:    Acetaminophen (Tylenol), Serum <3.0 (*)     All other components within normal limits   SALICYLATE LEVEL - Abnormal; Notable for the following components:    Salicylate Lvl <5.0 (*)     All other components within normal limits   TSH   URINALYSIS, REFLEX TO URINE CULTURE    Narrative:     Specimen Source->Urine   ALCOHOL,MEDICAL (ETHANOL)   SARS-COV-2 RDRP GENE     EKG Readings: (Independently Interpreted)   Sinus rhythm rate of 104, left axis deviation, left ventricular hypertrophy, QRS of 86 QTC of 444, early repolarization noted otherwise no acute ischemic abnormalities.  Interpreted by me.     Imaging Results    None          Medications - No data to display  Medical Decision Making:   Clinical Tests:   Lab Tests: Reviewed        <> Summary of Lab: Patient has a mild leukocytosis most likely a stress reaction, remainder is unremarkable, CBC is unremarkable, acetaminophen and salicylate levels are negative, alcohol is negative, urine drug screen is showing evidence of marijuana.  Tele psych and wife anticipate that this is the beginning of a manic episode and tele psych recommends inpatient admission.           ED Course as of 01/03/23 0041 Mon Jan 02, 2023 2126 Tele psychiatry states that the patient needs to be placed in an inpatient facility. [CD]      ED Course User Index  [CD] Walt Rivera DO       Medically cleared for psychiatry placement: 1/2/2023 11:20 PM         Clinical Impression:   Final diagnoses:  [R00.0] Tachycardia  [R46.89] Behavior concern (Primary)        ED Disposition Condition    Transfer to Psych Facility Stable          ED Prescriptions    None       Follow-up Information    None          Walt Rivera DO  01/03/23 0025       Walt Rivera DO  01/03/23 0042

## 2023-01-03 NOTE — ED NOTES
Pt arrives in police custody under Order of Protective Custody requested by pts spouse. Pt denies SI, HI, and hallucinations. States he has not slept in five days. Pt admitted to [facility] from 12/26 to 12/31, reports he has not slept since he was discharged. Pt calm and cooperative. AAOx3. Respirations even and unlabored. Skin is warm and dry. Sitter at bedside.

## 2023-01-03 NOTE — ED NOTES
Per pts wife Dian, pt has not slept in 3 days. Pt picking up objects and placing them in random places. Dirt from potted indoor plants scattered across living room floor. Pts wife states actions resemble beginning of manic state.

## 2023-01-03 NOTE — CONSULTS
"[facility]  Psychiatry  Telepsychiatry Consult Note    Please see previous notes:    Patient agreeable to consultation via telepsychiatry.    Tele-Consultation from Psychiatry started: 1/2/2023 at 2039  The chief complaint leading to psychiatric consultation is: OPC'd by wife  This consultation was requested by Dr. Walt Rivera, the Emergency Department attending physician.  The location of the consulting psychiatrist is  [place], [place] .  The patient location is  [facility] EMERGENCY DEPARTMENT   The patient arrived at the ED at: 1915    Also present with the patient at the time of the consultation: none    Patient Identification:   Lior Nguyen III is a 32 y.o. male.    Patient information was obtained from patient and spouse/SO.  Patient presented involuntarily to the Emergency Department on OPC    Inpatient consult to Telemedicine - Psychiatry (Now & Every 24 Hours)  Consult performed by: Ewa Harvey MD  Consult ordered by: Walt Rivera DO  Reason for consult: OPC'd by wife      Consult Start Time: 01/02/2023 20:39 CST        Subjective:     History of Present Illness:  Per ED MD Note:  32-year-old male who arrives on an OPC from his wife who has concern because the patient has not slept in 5 days, she states he is not eating, he denies this, but has been "fidgety" and rearranging things in the household in a "bizarre way".  The patient tells me that the reason he is not sleeping is because he is preparing himself for when he has a baby.  Per the patient he does not have a child and his wife is not pregnant.  He was recently discharged from [facility] where he states they did adjust his medications like his Zoloft and hydroxyzine.    Per Interview:  Patient has not slept since leaving [facility]. It is hard to come back and be the same person after being in the psych unit. Patient is only needing to sleep a couple hours. Patient has been reorganizing the house instead of " "sleeping. Denies doing anything impulsive. Said he was preparing for a baby "in case one popped up." Prior to taking Zoloft, he says every 6-7 years, patient sees things that are not there like a police car coming to get him and paranoia. Denies SI/HI or any plan/intent to self-harm or harm others.    Dian Nguyen  Wife  [phone]   was recently admitted for thoughts of killing his stepfather. Patient was admitted from 12/26-12/31. Since discharge, patient has not slept at all. He has not even taken a nap. He was constantly playing video games or doing a bunch of random things. Today, things started going for the worse. Patient was taking all of their belongings and scattering them randomly throughout their apartment and on the outdoor patio. He kept saying that he was trying to clean and organize. Patient called the police, and they were concerned that he was manic. He was not combative, not suicidal or homicidal. [place] police recommended contacting the 's office. Yesterday, the patient threw away the wife's birth control because he wants to have a baby; they are not preparing to have a baby. He does not have a baby. There are no children. In the past, patient has thought he heard voices. He has had previous episodes of insomnia. Mental health has declined significantly in the last 6 months. He was supposed to start a job this morning, but he never got dressed and went. When he was younger, he had suicidal thoughts but has not discussed this recently. Both his bio parents have endorsed SI; on mom's side, depression, anxiety, insomnia, schizophrenia, suicidal ideation. Wife is concerned because she did not speak with a psychiatric clinician to explain all these symptoms while he was in the hospital. He has been on sertraline for the last two months; wife believes that sertraline has worsened his irritability and insomnia.    Father  [phone]  Father knew that the " "patient had gotten out of the psych hospital. Wife has asked father to stay away because was having outbursts. However, other than that, he is not sure how his son has been doing since discharge from the psych unit.    Great Aunt  Kashmir Alvares  [phone]    History primarily from psych H&P from 12/27/22:  Psychiatric History:   Previous Psychiatric Hospitalizations: NO  Previous Medication Trials: YES: zoloft     History of psychotherapy:  NO  Previous Suicide Attempts: NO  History of Violence:  NO  History of physical/sexual abuse: NO  Outpatient psychiatrist (current & past): NO    Substance Abuse History:  Tobacco: YES: vapes     Alcohol: NO  Illicit Substances: YES: MJ     Detox/Rehab: NO    Legal History: Past charges/incarcerations: Yes - h/o incarceration for possession of MJ    Family Psychiatric History: No    Social History:  Developmental/Childhood:Achieved all developmental milestones timely  *Education: some college  Employment Status/Finances:Unemployed   Relationship Status/Sexual Orientation: : Relationship intact  Children: 0  Housing Status: Home    history:  NO  Access to gun: NO  Scientology:Spiritual without formal affiliation  Recreational activities:Time with family    Psychiatric Mental Status Exam:  Arousal: alert  Sensorium/Orientation: oriented to grossly intact  Behavior/Cooperation: normal, cooperative   Speech: normal tone, normal rate, normal pitch, normal volume  Language: grossly intact  Mood: " ok "   Affect:  slightly bizarre  Thought Process: normal and logical  Thought Content:   Auditory hallucinations: NO  Visual hallucinations: NO  Paranoia: NO  Delusions:  NO  Suicidal ideation: NO  Homicidal ideation: NO  Attention/Concentration:  intact  Memory:    Recent:  Intact   Remote: Intact  Insight: poor awareness of illness  Judgment: behavior is adequate to circumstances      Past Medical History:   Past Medical History:   Diagnosis Date    Anxiety       Laboratory " Data:   Labs Reviewed   DRUG SCREEN PANEL, URINE EMERGENCY - Abnormal; Notable for the following components:       Result Value    THC Presumptive Positive (*)     All other components within normal limits    Narrative:     Specimen Source->Urine   URINALYSIS, REFLEX TO URINE CULTURE    Narrative:     Specimen Source->Urine   CBC W/ AUTO DIFFERENTIAL   COMPREHENSIVE METABOLIC PANEL   TSH   ACETAMINOPHEN LEVEL   SALICYLATE LEVEL       Allergies:   Review of patient's allergies indicates:  No Known Allergies    Medications in ER: Medications - No data to display    Medications at home: sertraline    No new subjective & objective note has been filed under this hospital service since the last note was generated.      Assessment - Diagnosis - Goals:     Diagnosis/Impression: Patient is a 32yoM with past psychiatric history of depression and anxiety who was recently discharged from inpatient psych about 2 days. Since discharge, he has not slept and has been acting bizarrely. Wife is concerned that the patient has had worsened irritability since being started on sertraline and is specifically concerned that he is manic. Patient made some bizarre statements during interview but also stated that he was preparing for a baby that is not yet coming (there is no baby, and his wife is not pregnant). For these reasons, will recommend an inpatient psychiatric hospitalization.    Rec:   1. Dispo/Legal Status:  Cont PEC at this time as the pt is currently gravely disabled. Seek inpt bed for pt safety and stabilization when/if medically cleared by the ER MD.  2. Scheduled Medications: Discontinue sertraline. Start olanzapine 5mg nightly. Defer any non-psych meds to the ER MD.   3. PRN Medications: olanzapine 10mg po/im q8hr prn non-redirectable agitation associated with breakthrough psychosis or lenore if needed to help the pt more effectively interact with environment.  4. Precautions/Nursin:1 sitter  5. To-Do: Continue to  observe pt's behavior while in the ER  6. Case Discussed with: Dr. Rivera       Time with patient: 17 minutes  In total, 50 minutes were spent on chart review, discussion with ED, patient interview and charting      More than 50% of the time was spent counseling/coordinating care    Consulting clinician was informed of the encounter and consult note.    Consultation ended: 1/2/2023 at 3878    Ewa Harvey MD   Psychiatry  [facility]

## 2023-01-12 ENCOUNTER — PATIENT OUTREACH (OUTPATIENT)
Dept: ADMINISTRATIVE | Facility: CLINIC | Age: 33
End: 2023-01-12
Payer: MEDICAID

## 2023-01-12 NOTE — PROGRESS NOTES
C3 nurse spoke with Lior Nguyen III  for a TCC post hospital discharge follow up call. [facility] 1[facility] @ 8883.  Non [facility] PCP.

## 2023-01-12 NOTE — PROGRESS NOTES
C3 nurse attempted to contact Lior Washington III for a TCC post hospital discharge follow up call. No answer. Left voicemail with callback information. The patient has a scheduled [facility]OS[facility] appointment with [facility] on 1/23/23 @ 5422. Non [facility] PCP.

## 2023-01-14 ENCOUNTER — OFFICE VISIT (OUTPATIENT)
Dept: URGENT CARE | Facility: CLINIC | Age: 33
End: 2023-01-14
Payer: MEDICAID

## 2023-01-14 VITALS
TEMPERATURE: 98 F | RESPIRATION RATE: 19 BRPM | DIASTOLIC BLOOD PRESSURE: 68 MMHG | BODY MASS INDEX: 18.61 KG/M2 | WEIGHT: 145 LBS | SYSTOLIC BLOOD PRESSURE: 113 MMHG | HEART RATE: 88 BPM | OXYGEN SATURATION: 97 % | HEIGHT: 74 IN

## 2023-01-14 DIAGNOSIS — R05.9 COUGH, UNSPECIFIED TYPE: Primary | ICD-10-CM

## 2023-01-14 DIAGNOSIS — U07.1 COVID-19: ICD-10-CM

## 2023-01-14 LAB
CTP QC/QA: YES
SARS-COV-2 AG RESP QL IA.RAPID: POSITIVE

## 2023-01-14 PROCEDURE — 3008F BODY MASS INDEX DOCD: CPT | Mod: CPTII,S$GLB,,

## 2023-01-14 PROCEDURE — 3074F SYST BP LT 130 MM HG: CPT | Mod: CPTII,S$GLB,,

## 2023-01-14 PROCEDURE — 3074F PR MOST RECENT SYSTOLIC BLOOD PRESSURE < 130 MM HG: ICD-10-PCS | Mod: CPTII,S$GLB,,

## 2023-01-14 PROCEDURE — 99213 PR OFFICE/OUTPT VISIT, EST, LEVL III, 20-29 MIN: ICD-10-PCS | Mod: S$GLB,,,

## 2023-01-14 PROCEDURE — 3044F PR MOST RECENT HEMOGLOBIN A1C LEVEL <7.0%: ICD-10-PCS | Mod: CPTII,S$GLB,,

## 2023-01-14 PROCEDURE — 87811 SARS CORONAVIRUS 2 ANTIGEN POCT, MANUAL READ: ICD-10-PCS | Mod: QW,S$GLB,,

## 2023-01-14 PROCEDURE — 1159F PR MEDICATION LIST DOCUMENTED IN MEDICAL RECORD: ICD-10-PCS | Mod: CPTII,S$GLB,,

## 2023-01-14 PROCEDURE — 3044F HG A1C LEVEL LT 7.0%: CPT | Mod: CPTII,S$GLB,,

## 2023-01-14 PROCEDURE — 87811 SARS-COV-2 COVID19 W/OPTIC: CPT | Mod: QW,S$GLB,,

## 2023-01-14 PROCEDURE — 1159F MED LIST DOCD IN RCRD: CPT | Mod: CPTII,S$GLB,,

## 2023-01-14 PROCEDURE — 99213 OFFICE O/P EST LOW 20 MIN: CPT | Mod: S$GLB,,,

## 2023-01-14 PROCEDURE — 1160F RVW MEDS BY RX/DR IN RCRD: CPT | Mod: CPTII,S$GLB,,

## 2023-01-14 PROCEDURE — 3078F DIAST BP <80 MM HG: CPT | Mod: CPTII,S$GLB,,

## 2023-01-14 PROCEDURE — 1160F PR REVIEW ALL MEDS BY PRESCRIBER/CLIN PHARMACIST DOCUMENTED: ICD-10-PCS | Mod: CPTII,S$GLB,,

## 2023-01-14 PROCEDURE — 3008F PR BODY MASS INDEX (BMI) DOCUMENTED: ICD-10-PCS | Mod: CPTII,S$GLB,,

## 2023-01-14 PROCEDURE — 3078F PR MOST RECENT DIASTOLIC BLOOD PRESSURE < 80 MM HG: ICD-10-PCS | Mod: CPTII,S$GLB,,

## 2023-01-14 RX ORDER — PROMETHAZINE HYDROCHLORIDE AND DEXTROMETHORPHAN HYDROBROMIDE 6.25; 15 MG/5ML; MG/5ML
5 SYRUP ORAL NIGHTLY PRN
Qty: 118 ML | Refills: 0 | Status: SHIPPED | OUTPATIENT
Start: 2023-01-14 | End: 2023-01-24

## 2023-01-14 RX ORDER — BENZONATATE 200 MG/1
200 CAPSULE ORAL 3 TIMES DAILY PRN
Qty: 21 CAPSULE | Refills: 0 | Status: SHIPPED | OUTPATIENT
Start: 2023-01-14 | End: 2023-01-21

## 2023-01-14 RX ORDER — CETIRIZINE HYDROCHLORIDE 10 MG/1
10 TABLET ORAL DAILY
Qty: 30 TABLET | Refills: 0 | Status: SHIPPED | OUTPATIENT
Start: 2023-01-14 | End: 2023-02-13

## 2023-01-14 RX ORDER — FLUTICASONE PROPIONATE 50 MCG
2 SPRAY, SUSPENSION (ML) NASAL DAILY
Qty: 9.9 ML | Refills: 0 | Status: SHIPPED | OUTPATIENT
Start: 2023-01-14 | End: 2023-02-10

## 2023-01-14 NOTE — PATIENT INSTRUCTIONS
You have tested positive for COVID-19 today.      ISOLATION  If you tested positive and do not have symptoms, you must isolate for 5 days starting on the day of the positive test. I    If you tested positive and have symptoms, you must isolate for 5 days starting on the day of the first symptoms,  not the day of the positive test.     This is the most important part, both the CDC and the [facility] emphasize that you do not test out of isolation.     After 5 days, if your symptoms have improved and you have not had fever on day 5, you can return to the community on day 6- NO TESTING REQUIRED!      In fact, we do not retest if you were positive in the last 90 days.    After your 5 days of isolation are completed, the CDC recommends strict mask use for the first 5 days that you come out of isolation. PLEASE FOLLOW CDC GUIDELINES REGARDING TRAVEL AFTER COVID INFECTION.    Return to Urgent Care or go to ER if symptoms worsen or fail to improve.  Follow up with PCP as recommended for further management.     Your symptoms should begin to improve by Day 5 of the infection-- if symptoms worsen, you develop a new fever, shortness of breath, worsening shortness of breath with activity, chest pain, worsening cough, you must return to Urgent Care or go to the ER.    PLEASE READ YOUR DISCHARGE INSTRUCTIONS ENTIRELY AS IT CONTAINS IMPORTANT INFORMATION.      Please drink plenty of fluids.    Please get plenty of rest.    Please return here or go to the Emergency Department for any concerns or worsening of condition.      Tylenol or ibuprofen can also be used as directed for pain and fever unless you have an allergy to them or medical condition such as stomach ulcers, kidney or liver disease or blood thinners etc for which you should not be taking these type of medications.   YOU CAN ALTERNATE TYLENOL AND IBUPROFEN EVERY 3-4 HOURS. Take 1000mg (2 pills) of Extra Strength Acetaminophen (Tylenol) every 6 hours and 600mg (3 pills) of  Ibuprofen (Motrin/Advil) every 6 hours, alternating the two so that every 3 hours you take one or the other. Start with the Tylenol, then 3 hours later take the Ibuprofen, then 3 hours later take the Tylenol again, and so on.         For your allergy symptoms and/or runny nose, sinus/ear pressure, congestion:        - You can take over-the-counter claritin, zyrtec, allegra, or xyzal as directed. These are antihistamines that can help with runny nose, nasal congestion, sneezing, and helps to dry up post-nasal drip, which usually causes sore throat and cough.               - If you do NOT have high blood pressure, you may use a decongestant form (D)  of this medication (ie. Claritin- D, zyrtec-D, allegra-D) or if you do not take the D form, you can take sudafed (pseudoephedrine) over the counter, which is a decongestant. Do NOT take two decongestant (D) medications at the same time (such as mucinex-D and claritin-D or plain sudafed and claritin D). Dextromethorphan (DM) is a cough suppressant over the counter (ie. mucinex DM, robitussin, delsym; dayquil/nyquil has DM as well.)    -If you DO have high blood pressure, AVOID DECONGESTANTS (I.e., Phenylephrine and Pseudoephedrine). You may take Coricidin HBP for sinus congestion and Delsym for cough.        - You can take plain Mucinex (guaifenesin) 1200 mg twice a day to help loosen mucous.       Use over the counter flonase or nasocort: one spray each nostril twice daily OR two sprays each nostril once daily until nares dry out, unless you have Glaucoma.   If you find this dries your nose out or your nose bleeds, try using over the counter nasal saline a few minutes prior to using the flonase to moisten the lining of your nose and throughout the day as needed.   Flonase/nasal spray use directions:  1) Use once per day.  2) Blow nose first.  3) Close one nostril and spray flonase up the other nostril while inhaling gently.   4) If you inhale to aggressively, you will  have a bitter taste in the back of your mouth.       You can try breathe right strips at night to help you breathe.  A cool mist humidifier in bedroom may help with cough and relieve stuffy nose.     Sinus rinses DO NOT USE TAP WATER, if you must, water must be a rolling boil for 1 minute, let it cool, then use.  May use distilled water, or over the counter nasal saline rinses.  Vics vapor rub in shower to help open nasal passages.  May use nasal gel to keep passages moisturized.  May use Nasal saline sprays during the day for added relief of congestion.   For those who go to the gym, please do not use the sauna or steam room now to clear sinuses.      Sore throat recommendations: Warm fluids, warm salt water gargles, throat lozenges, tea, honey, soup, rest, hydration.      Cough     Honey with robbie to soothe your throat    Robitussin or Delsyum for cough suppressant for dry cough.    Mucinex DM or products containing Guaifenesin or Dextromethorphan for expectorant (wet cough).    Take prescription cough meds (pills) as prescribed; take prescription cough syrup at night as needed for cough.  Do not take both the prescribed cough pills and syrup at the same time or within 6 hours of each other.  Do not take the cough syrup with any other sedative medication as it can can cause drowsiness. Do not operate any heavy machinery, drink or drive while taking the cough syrup.    Try taking half a dose first of the cough syrup to see how it affects you.       Please follow up with your primary care doctor or specialist in the next 48-72hrs as needed and if no improvement    If you  smoke, please stop smoking.      Please return or see your primary care doctor if you develop new or worsening symptoms.     Please arrange follow up with your primary medical clinic as soon as possible. You must understand that you've received an Urgent Care treatment only and that you may be released before all of your medical problems are  known or treated. You, the patient, will arrange for follow up as instructed. If your symptoms worsen or fail to improve you should go to the Emergency Room.

## 2023-01-14 NOTE — LETTER
"Lior "Mina Nguyen was seen and treated in our emergency department on 1/14/2023.     COVID-19 is present in our communities across the state. There is limited testing for COVID at this time, so not all patients can be tested. In this situation, your employee meets the following criteria:    Lior Nguyen III has met the criteria for COVID-19 testing and has a POSITIVE result. He can return to school once they are asymptomatic for 24 hours without the use of fever reducing medications AND at least five days from the first positive result. A mask is recommended for 5 days post quarantine.     If you have any questions or concerns, or if I can be of further assistance, please do not hesitate to contact me.    Sincerely,             Lucy Arnold PA-C  "

## 2023-01-14 NOTE — PROGRESS NOTES
"Subjective:       Patient ID: Lior Nguyen III is a 32 y.o. male.    Vitals:  height is 6' 2" (1.88 m) and weight is 65.8 kg (145 lb). His temperature is 97.8 °F (36.6 °C). His blood pressure is 113/68 and his pulse is 88. His respiration is 19 and oxygen saturation is 97%.     Chief Complaint: Cough    32 year old patient presents cough. Patient stated symptoms started 5 days ago. States he is having body aches, congestion, runny nose. "Feels like a cold." Tried OTC medicine without relief. States he needs a work note for today. NKDA.     Cough  This is a new problem. The current episode started in the past 7 days (5 days ago). The problem has been unchanged. The problem occurs every few minutes. Associated symptoms include chills, headaches, myalgias (generalized body aches), nasal congestion, a sore throat and sweats. Pertinent negatives include no chest pain, ear pain, eye redness, fever, shortness of breath or wheezing. Associated symptoms comments: Bodyaches . He has tried OTC cough suppressant for the symptoms. The treatment provided no relief.     Constitution: Positive for chills. Negative for fever.   HENT:  Positive for congestion and sore throat. Negative for ear pain, ear discharge, sinus pain, sinus pressure and trouble swallowing.    Cardiovascular:  Negative for chest pain.   Eyes:  Negative for eye redness and double vision.   Respiratory:  Positive for cough. Negative for shortness of breath, wheezing and asthma.    Gastrointestinal:  Negative for vomiting and diarrhea.   Musculoskeletal:  Positive for muscle ache (generalized body aches).   Allergic/Immunologic: Negative for asthma.   Neurological:  Positive for headaches.     Objective:      Vitals:    01/14/23 1535   BP: 113/68   Pulse: 88   Resp: 19   Temp: 97.8 °F (36.6 °C)       Physical Exam   Constitutional: He is oriented to person, place, and time. He appears well-developed. He is cooperative.  Non-toxic appearance. He does not appear " ill. No distress.   HENT:   Head: Normocephalic and atraumatic.   Ears:   Right Ear: Hearing, tympanic membrane, external ear and ear canal normal.   Left Ear: Hearing, tympanic membrane, external ear and ear canal normal.   Nose: Nose normal. No mucosal edema, rhinorrhea or nasal deformity. No epistaxis. Right sinus exhibits no maxillary sinus tenderness and no frontal sinus tenderness. Left sinus exhibits no maxillary sinus tenderness and no frontal sinus tenderness.   Mouth/Throat: Uvula is midline, oropharynx is clear and moist and mucous membranes are normal. Mucous membranes are moist. No trismus in the jaw. Normal dentition. No uvula swelling. No oropharyngeal exudate, posterior oropharyngeal edema, posterior oropharyngeal erythema or cobblestoning.   Eyes: Conjunctivae and lids are normal. No scleral icterus.   Neck: Trachea normal and phonation normal. Neck supple. No edema present. No erythema present. No neck rigidity present.   Cardiovascular: Normal rate, regular rhythm, normal heart sounds and normal pulses.   Pulmonary/Chest: Effort normal and breath sounds normal. No respiratory distress. He has no decreased breath sounds. He has no wheezes. He has no rhonchi. He has no rales.   Abdominal: Normal appearance.   Musculoskeletal: Normal range of motion.         General: No deformity. Normal range of motion.   Neurological: He is alert and oriented to person, place, and time. He exhibits normal muscle tone. Coordination normal.   Skin: Skin is warm, dry, intact, not diaphoretic and not pale.   Psychiatric: His speech is normal and behavior is normal. Judgment and thought content normal.   Nursing note and vitals reviewed.      Assessment:       1. Cough, unspecified type    2. COVID-19          Results for orders placed or performed in visit on 01/14/23   SARS Coronavirus 2 Antigen, POCT Manual Read   Result Value Ref Range    SARS Coronavirus 2 Antigen Positive (A) Negative     Acceptable  Yes        Plan:         Cough, unspecified type  -     SARS Coronavirus 2 Antigen, POCT Manual Read  -     Cancel: POCT Influenza A/B MOLECULAR  -     benzonatate (TESSALON) 200 MG capsule; Take 1 capsule (200 mg total) by mouth 3 (three) times daily as needed for Cough.  Dispense: 21 capsule; Refill: 0  -     promethazine-dextromethorphan (PROMETHAZINE-DM) 6.25-15 mg/5 mL Syrp; Take 5 mLs by mouth nightly as needed (cough).  Dispense: 118 mL; Refill: 0    COVID-19  -     fluticasone propionate (FLONASE) 50 mcg/actuation nasal spray; 2 sprays (100 mcg total) by Each Nostril route once daily.  Dispense: 9.9 mL; Refill: 0  -     cetirizine (ZYRTEC) 10 MG tablet; Take 1 tablet (10 mg total) by mouth once daily.  Dispense: 30 tablet; Refill: 0         Patient Instructions   You have tested positive for COVID-19 today.      ISOLATION  If you tested positive and do not have symptoms, you must isolate for 5 days starting on the day of the positive test. I    If you tested positive and have symptoms, you must isolate for 5 days starting on the day of the first symptoms,  not the day of the positive test.     This is the most important part, both the CDC and the [facility] emphasize that you do not test out of isolation.     After 5 days, if your symptoms have improved and you have not had fever on day 5, you can return to the community on day 6- NO TESTING REQUIRED!      In fact, we do not retest if you were positive in the last 90 days.    After your 5 days of isolation are completed, the CDC recommends strict mask use for the first 5 days that you come out of isolation. PLEASE FOLLOW CDC GUIDELINES REGARDING TRAVEL AFTER COVID INFECTION.    Return to Urgent Care or go to ER if symptoms worsen or fail to improve.  Follow up with PCP as recommended for further management.     Your symptoms should begin to improve by Day 5 of the infection-- if symptoms worsen, you develop a new fever, shortness of breath, worsening shortness of  breath with activity, chest pain, worsening cough, you must return to Urgent Care or go to the ER.    PLEASE READ YOUR DISCHARGE INSTRUCTIONS ENTIRELY AS IT CONTAINS IMPORTANT INFORMATION.      Please drink plenty of fluids.    Please get plenty of rest.    Please return here or go to the Emergency Department for any concerns or worsening of condition.      Tylenol or ibuprofen can also be used as directed for pain and fever unless you have an allergy to them or medical condition such as stomach ulcers, kidney or liver disease or blood thinners etc for which you should not be taking these type of medications.   YOU CAN ALTERNATE TYLENOL AND IBUPROFEN EVERY 3-4 HOURS. Take 1000mg (2 pills) of Extra Strength Acetaminophen (Tylenol) every 6 hours and 600mg (3 pills) of Ibuprofen (Motrin/Advil) every 6 hours, alternating the two so that every 3 hours you take one or the other. Start with the Tylenol, then 3 hours later take the Ibuprofen, then 3 hours later take the Tylenol again, and so on.         For your allergy symptoms and/or runny nose, sinus/ear pressure, congestion:        - You can take over-the-counter claritin, zyrtec, allegra, or xyzal as directed. These are antihistamines that can help with runny nose, nasal congestion, sneezing, and helps to dry up post-nasal drip, which usually causes sore throat and cough.               - If you do NOT have high blood pressure, you may use a decongestant form (D)  of this medication (ie. Claritin- D, zyrtec-D, allegra-D) or if you do not take the D form, you can take sudafed (pseudoephedrine) over the counter, which is a decongestant. Do NOT take two decongestant (D) medications at the same time (such as mucinex-D and claritin-D or plain sudafed and claritin D). Dextromethorphan (DM) is a cough suppressant over the counter (ie. mucinex DM, robitussin, delsym; dayquil/nyquil has DM as well.)    -If you DO have high blood pressure, AVOID DECONGESTANTS (I.e., Phenylephrine  and Pseudoephedrine). You may take Coricidin HBP for sinus congestion and Delsym for cough.        - You can take plain Mucinex (guaifenesin) 1200 mg twice a day to help loosen mucous.       Use over the counter flonase or nasocort: one spray each nostril twice daily OR two sprays each nostril once daily until nares dry out, unless you have Glaucoma.   If you find this dries your nose out or your nose bleeds, try using over the counter nasal saline a few minutes prior to using the flonase to moisten the lining of your nose and throughout the day as needed.   Flonase/nasal spray use directions:  1) Use once per day.  2) Blow nose first.  3) Close one nostril and spray flonase up the other nostril while inhaling gently.   4) If you inhale to aggressively, you will have a bitter taste in the back of your mouth.       You can try breathe right strips at night to help you breathe.  A cool mist humidifier in bedroom may help with cough and relieve stuffy nose.     Sinus rinses DO NOT USE TAP WATER, if you must, water must be a rolling boil for 1 minute, let it cool, then use.  May use distilled water, or over the counter nasal saline rinses.  Vics vapor rub in shower to help open nasal passages.  May use nasal gel to keep passages moisturized.  May use Nasal saline sprays during the day for added relief of congestion.   For those who go to the gym, please do not use the sauna or steam room now to clear sinuses.      Sore throat recommendations: Warm fluids, warm salt water gargles, throat lozenges, tea, honey, soup, rest, hydration.      Cough     Honey with robbie to soothe your throat    Robitussin or Delsyum for cough suppressant for dry cough.    Mucinex DM or products containing Guaifenesin or Dextromethorphan for expectorant (wet cough).    Take prescription cough meds (pills) as prescribed; take prescription cough syrup at night as needed for cough.  Do not take both the prescribed cough pills and syrup at the same  time or within 6 hours of each other.  Do not take the cough syrup with any other sedative medication as it can can cause drowsiness. Do not operate any heavy machinery, drink or drive while taking the cough syrup.    Try taking half a dose first of the cough syrup to see how it affects you.       Please follow up with your primary care doctor or specialist in the next 48-72hrs as needed and if no improvement    If you  smoke, please stop smoking.      Please return or see your primary care doctor if you develop new or worsening symptoms.     Please arrange follow up with your primary medical clinic as soon as possible. You must understand that you've received an Urgent Care treatment only and that you may be released before all of your medical problems are known or treated. You, the patient, will arrange for follow up as instructed. If your symptoms worsen or fail to improve you should go to the Emergency Room.

## 2023-06-16 ENCOUNTER — OFFICE VISIT (OUTPATIENT)
Dept: URGENT CARE | Facility: CLINIC | Age: 33
End: 2023-06-16
Payer: MEDICAID

## 2023-06-16 VITALS
HEART RATE: 66 BPM | HEIGHT: 74 IN | WEIGHT: 145 LBS | DIASTOLIC BLOOD PRESSURE: 77 MMHG | SYSTOLIC BLOOD PRESSURE: 122 MMHG | OXYGEN SATURATION: 99 % | TEMPERATURE: 98 F | RESPIRATION RATE: 16 BRPM | BODY MASS INDEX: 18.61 KG/M2

## 2023-06-16 DIAGNOSIS — S91.331A PUNCTURE WOUND OF RIGHT FOOT, INITIAL ENCOUNTER: Primary | ICD-10-CM

## 2023-06-16 PROCEDURE — 90715 TDAP VACCINE GREATER THAN OR EQUAL TO 7YO IM: ICD-10-PCS | Mod: S$GLB,,, | Performed by: NURSE PRACTITIONER

## 2023-06-16 PROCEDURE — 99213 OFFICE O/P EST LOW 20 MIN: CPT | Mod: 25,S$GLB,, | Performed by: NURSE PRACTITIONER

## 2023-06-16 PROCEDURE — 90715 TDAP VACCINE 7 YRS/> IM: CPT | Mod: S$GLB,,, | Performed by: NURSE PRACTITIONER

## 2023-06-16 PROCEDURE — 90471 TDAP VACCINE GREATER THAN OR EQUAL TO 7YO IM: ICD-10-PCS | Mod: S$GLB,,, | Performed by: NURSE PRACTITIONER

## 2023-06-16 PROCEDURE — 99213 PR OFFICE/OUTPT VISIT, EST, LEVL III, 20-29 MIN: ICD-10-PCS | Mod: 25,S$GLB,, | Performed by: NURSE PRACTITIONER

## 2023-06-16 PROCEDURE — 90471 IMMUNIZATION ADMIN: CPT | Mod: S$GLB,,, | Performed by: NURSE PRACTITIONER

## 2023-06-16 RX ORDER — MUPIROCIN 20 MG/G
OINTMENT TOPICAL
Status: DISCONTINUED | OUTPATIENT
Start: 2023-06-16 | End: 2023-06-16

## 2023-06-16 NOTE — PROGRESS NOTES
"Subjective:      Patient ID: Lior Nguyen III is a 33 y.o. male.    Vitals:  height is 6' 2" (1.88 m) and weight is 65.8 kg (145 lb). His oral temperature is 97.6 °F (36.4 °C). His blood pressure is 122/77 and his pulse is 66. His respiration is 16 and oxygen saturation is 99%.     Chief Complaint: Puncture Wound (Right foot)      Pt is a 34 yo male presenting with puncture wound of right foot.  Onset of symptoms was last night when patient stepped on a nail and it went through his shoe. Patient's last tetanus was in 2009    Foot Injury   The incident occurred 12 to 24 hours ago. The incident occurred at home. The injury mechanism was an inversion injury. The pain is present in the right foot. The quality of the pain is described as aching and burning. The pain is at a severity of 3/10. The pain is mild. The pain has been Intermittent since onset. Associated symptoms include tingling. Pertinent negatives include no inability to bear weight, loss of motion, loss of sensation, muscle weakness or numbness. He reports no foreign bodies present. The symptoms are aggravated by palpation, weight bearing and movement. He has tried nothing for the symptoms.     Constitution: Negative for chills, fatigue and fever.   Cardiovascular:  Negative for chest pain.   Respiratory:  Negative for chest tightness and shortness of breath.    Skin:  Positive for wound (puncture wound right plantar midfoot - tender, no redness or drainage).   Neurological:  Negative for headaches and numbness.    Objective:     Physical Exam   Constitutional: He is oriented to person, place, and time.   Eyes: EOM are normal.   Cardiovascular: Normal rate and regular rhythm.   Pulmonary/Chest: Effort normal and breath sounds normal. No respiratory distress.   Neurological: He is alert and oriented to person, place, and time.   Skin: Skin is warm and dry.         Comments: Right plantar midfoot - tiny puncture wound crusted - tender but no erythema or " edema   Nursing note and vitals reviewed.    Assessment:     1. Puncture wound of right foot, initial encounter        Plan:       Puncture wound of right foot, initial encounter  -     mupirocin 2 % ointment  -     (In Office Administered) Tdap Vaccine      Patient Instructions   Puncture wound of right foot, initial encounter    -     mupirocin 2 % ointment applied after cleaning wound with hydrogen peroxide.    -     (In Office Administered) Tdap Vaccine      Wound Care    Gently clean wound with soap and water twice daily.    Signs of Infection:  Increase in redness (In initial stages, redness is normal at the wound edge but should not continue to increase)  Increase in pain and swelling (Similar to redness, pain and swelling is a normal part of healing, but should not continue or worsen after 3-5 days)  Yellowish drainage or fever after a few days    Return to urgent care if signs of infections start.

## 2023-06-16 NOTE — PATIENT INSTRUCTIONS
Puncture wound of right foot, initial encounter    -     mupirocin 2 % ointment applied after cleaning wound with hydrogen peroxide.    -     (In Office Administered) Tdap Vaccine      Wound Care    Gently clean wound with soap and water twice daily.    Signs of Infection:  Increase in redness (In initial stages, redness is normal at the wound edge but should not continue to increase)  Increase in pain and swelling (Similar to redness, pain and swelling is a normal part of healing, but should not continue or worsen after 3-5 days)  Yellowish drainage or fever after a few days    Return to urgent care if signs of infections start.

## 2023-08-14 ENCOUNTER — HOSPITAL ENCOUNTER (EMERGENCY)
Facility: HOSPITAL | Age: 33
Discharge: PSYCHIATRIC OR SUBSTANCE USE FACILITY | End: 2023-08-15
Attending: EMERGENCY MEDICINE
Payer: MEDICAID

## 2023-08-14 DIAGNOSIS — Z00.8 MEDICAL CLEARANCE FOR PSYCHIATRIC ADMISSION: ICD-10-CM

## 2023-08-14 DIAGNOSIS — R45.1 AGITATION: Primary | ICD-10-CM

## 2023-08-14 LAB
ALBUMIN SERPL BCP-MCNC: 4.1 G/DL (ref 3.5–5.2)
ALP SERPL-CCNC: 102 U/L (ref 55–135)
ALT SERPL W/O P-5'-P-CCNC: 13 U/L (ref 10–44)
ANION GAP SERPL CALC-SCNC: 9 MMOL/L (ref 8–16)
AST SERPL-CCNC: 15 U/L (ref 10–40)
BASOPHILS # BLD AUTO: 0.05 K/UL (ref 0–0.2)
BASOPHILS NFR BLD: 0.7 % (ref 0–1.9)
BILIRUB SERPL-MCNC: 0.5 MG/DL (ref 0.1–1)
BILIRUB UR QL STRIP: NEGATIVE
BUN SERPL-MCNC: 13 MG/DL (ref 6–20)
CALCIUM SERPL-MCNC: 8.8 MG/DL (ref 8.7–10.5)
CHLORIDE SERPL-SCNC: 106 MMOL/L (ref 95–110)
CLARITY UR REFRACT.AUTO: CLEAR
CO2 SERPL-SCNC: 24 MMOL/L (ref 23–29)
COLOR UR AUTO: YELLOW
CREAT SERPL-MCNC: 1 MG/DL (ref 0.5–1.4)
DIFFERENTIAL METHOD: ABNORMAL
EOSINOPHIL # BLD AUTO: 0.2 K/UL (ref 0–0.5)
EOSINOPHIL NFR BLD: 2.5 % (ref 0–8)
ERYTHROCYTE [DISTWIDTH] IN BLOOD BY AUTOMATED COUNT: 12.3 % (ref 11.5–14.5)
EST. GFR  (NO RACE VARIABLE): >60 ML/MIN/1.73 M^2
ETHANOL SERPL-MCNC: <10 MG/DL
GLUCOSE SERPL-MCNC: 77 MG/DL (ref 70–110)
GLUCOSE UR QL STRIP: NEGATIVE
HCT VFR BLD AUTO: 41.3 % (ref 40–54)
HGB BLD-MCNC: 12.9 G/DL (ref 14–18)
HGB UR QL STRIP: NEGATIVE
IMM GRANULOCYTES # BLD AUTO: 0.01 K/UL (ref 0–0.04)
IMM GRANULOCYTES NFR BLD AUTO: 0.1 % (ref 0–0.5)
KETONES UR QL STRIP: NEGATIVE
LEUKOCYTE ESTERASE UR QL STRIP: NEGATIVE
LYMPHOCYTES # BLD AUTO: 2.5 K/UL (ref 1–4.8)
LYMPHOCYTES NFR BLD: 33.2 % (ref 18–48)
MCH RBC QN AUTO: 28.4 PG (ref 27–31)
MCHC RBC AUTO-ENTMCNC: 31.2 G/DL (ref 32–36)
MCV RBC AUTO: 91 FL (ref 82–98)
MONOCYTES # BLD AUTO: 0.4 K/UL (ref 0.3–1)
MONOCYTES NFR BLD: 5 % (ref 4–15)
NEUTROPHILS # BLD AUTO: 4.4 K/UL (ref 1.8–7.7)
NEUTROPHILS NFR BLD: 58.5 % (ref 38–73)
NITRITE UR QL STRIP: NEGATIVE
NRBC BLD-RTO: 0 /100 WBC
PH UR STRIP: 7 [PH] (ref 5–8)
PLATELET # BLD AUTO: 206 K/UL (ref 150–450)
PMV BLD AUTO: 10.7 FL (ref 9.2–12.9)
POTASSIUM SERPL-SCNC: 4 MMOL/L (ref 3.5–5.1)
PROT SERPL-MCNC: 6.9 G/DL (ref 6–8.4)
PROT UR QL STRIP: NEGATIVE
RBC # BLD AUTO: 4.55 M/UL (ref 4.6–6.2)
SODIUM SERPL-SCNC: 139 MMOL/L (ref 136–145)
SP GR UR STRIP: 1.02 (ref 1–1.03)
TSH SERPL DL<=0.005 MIU/L-ACNC: 1.45 UIU/ML (ref 0.4–4)
URN SPEC COLLECT METH UR: NORMAL
WBC # BLD AUTO: 7.55 K/UL (ref 3.9–12.7)

## 2023-08-14 PROCEDURE — 80053 COMPREHEN METABOLIC PANEL: CPT | Performed by: EMERGENCY MEDICINE

## 2023-08-14 PROCEDURE — 82077 ASSAY SPEC XCP UR&BREATH IA: CPT | Performed by: EMERGENCY MEDICINE

## 2023-08-14 PROCEDURE — 84443 ASSAY THYROID STIM HORMONE: CPT | Performed by: EMERGENCY MEDICINE

## 2023-08-14 PROCEDURE — 99285 EMERGENCY DEPT VISIT HI MDM: CPT

## 2023-08-14 PROCEDURE — 81003 URINALYSIS AUTO W/O SCOPE: CPT | Mod: 59 | Performed by: EMERGENCY MEDICINE

## 2023-08-14 PROCEDURE — 80307 DRUG TEST PRSMV CHEM ANLYZR: CPT | Performed by: EMERGENCY MEDICINE

## 2023-08-14 PROCEDURE — 85025 COMPLETE CBC W/AUTO DIFF WBC: CPT | Performed by: EMERGENCY MEDICINE

## 2023-08-15 VITALS
DIASTOLIC BLOOD PRESSURE: 74 MMHG | HEART RATE: 60 BPM | OXYGEN SATURATION: 98 % | RESPIRATION RATE: 16 BRPM | TEMPERATURE: 98 F | SYSTOLIC BLOOD PRESSURE: 128 MMHG

## 2023-08-15 LAB
AMPHET+METHAMPHET UR QL: NEGATIVE
BARBITURATES UR QL SCN>200 NG/ML: NEGATIVE
BENZODIAZ UR QL SCN>200 NG/ML: NEGATIVE
BZE UR QL SCN: NEGATIVE
CANNABINOIDS UR QL SCN: ABNORMAL
CREAT UR-MCNC: 212 MG/DL (ref 23–375)
METHADONE UR QL SCN>300 NG/ML: NEGATIVE
OPIATES UR QL SCN: NEGATIVE
PCP UR QL SCN>25 NG/ML: NEGATIVE
TOXICOLOGY INFORMATION: ABNORMAL

## 2023-08-15 NOTE — ED NOTES
Patient awake & informed that wife called but was unable to provide her any information w/o his permission. Patient states that wife can be informed of his ER visit. Dialed wife's number so that they could communicate w/ eachother. Wife is Mrs. Dian Nguyen @ [phone].

## 2023-08-15 NOTE — ED NOTES
Belongings  1 tooth paste  1 tooth brush  1 loofah   1 small bottle of mouthwash  1 deodorant  1 red pair of red basketball shorts  1 black shirt  1 pair of [employer]idas (black w/ williams stripes)    Valuables  $4.11  drivers license  cellphone  wallet  (4)insurance cards  Covid vaccination card

## 2023-08-15 NOTE — ED NOTES
Assumed pt care, he is lying on the stretcher resting, pt dressed in paper scrubs, all belongings secured outside of the room. Pt has placement to [facility]. EDT at the bedside DVC maintained.

## 2023-08-15 NOTE — ED PROVIDER NOTES
Encounter Date: 8/14/2023       History     Chief Complaint   Patient presents with    Psychiatric Evaluation     Hx of schizoaffective d/o, violent behavior at home, denies si/hi/ah/vh, per EMS patient calm and cooperative     33-year-old male with a history of schizoaffective disorder presents via EMS after an episode of agitation at home.  Patient got an argument with his wife over money.  Ripped a door off the hinges.  He says his Zyprexa is not working.  Increased stress from work and money.  Patient denies nausea, vomiting, diarrhea, fever, cough, shortness of breath, chest pain, abdominal pain, or dysuria.  The patients available PMH, PSH, Social History, medications, allergies, and triage vital signs were reviewed just prior to their medical evaluation.            Review of patient's allergies indicates:   Allergen Reactions    Bananas [banana]      Past Medical History:   Diagnosis Date    Anxiety     Bipolar disorder     History of psychiatric hospitalization     Hx of psychiatric care     Psychiatric problem     Schizoaffective disorder     Sleep difficulties     Suicide attempt      History reviewed. No pertinent surgical history.  Family History   Problem Relation Age of Onset    Depression Mother     Schizophrenia Father      Social History     Tobacco Use    Smoking status: Every Day     Current packs/day: 0.50     Types: Vaping with nicotine, Cigarettes    Smokeless tobacco: Never   Substance Use Topics    Alcohol use: Yes     Alcohol/week: 4.0 standard drinks of alcohol     Types: 2 Glasses of wine, 2 Cans of beer per week     Comment: pt drinks occasionally    Drug use: Yes     Frequency: 7.0 times per week     Types: Marijuana     Comment: daily use     Review of Systems   Constitutional:  Negative for fever.   Respiratory:  Negative for cough and shortness of breath.    Cardiovascular:  Negative for chest pain.   Gastrointestinal:  Negative for abdominal pain, diarrhea, nausea and vomiting.    Genitourinary:  Negative for dysuria.   Psychiatric/Behavioral:  Positive for agitation and behavioral problems.        Physical Exam     Initial Vitals [08/14/23 1934]   BP Pulse Resp Temp SpO2   120/76 74 16 98.6 °F (37 °C) 98 %      MAP       --         Physical Exam    Nursing note and vitals reviewed.  Constitutional: He appears well-developed and well-nourished. He is not diaphoretic. No distress.   HENT:   Head: Normocephalic and atraumatic.   Nose: Nose normal.   Eyes: Conjunctivae are normal. Right eye exhibits no discharge. Left eye exhibits no discharge.   Neck: Neck supple.   Normal range of motion.  Cardiovascular:  Normal rate, regular rhythm and normal heart sounds.     Exam reveals no gallop and no friction rub.       No murmur heard.  Pulmonary/Chest: Breath sounds normal. No respiratory distress. He has no wheezes. He has no rhonchi. He has no rales.   Abdominal: Abdomen is soft. He exhibits no distension. There is no abdominal tenderness. There is no rebound and no guarding.   Musculoskeletal:         General: No tenderness or edema. Normal range of motion.      Cervical back: Normal range of motion and neck supple.     Neurological: He is alert and oriented to person, place, and time. He has normal strength. GCS score is 15. GCS eye subscore is 4. GCS verbal subscore is 5. GCS motor subscore is 6.   Skin: Skin is warm and dry. No rash noted. No erythema.   Psychiatric: He has a normal mood and affect. His behavior is normal. Judgment and thought content normal.         ED Course   Procedures  Labs Reviewed   CBC W/ AUTO DIFFERENTIAL - Abnormal; Notable for the following components:       Result Value    RBC 4.55 (*)     Hemoglobin 12.9 (*)     MCHC 31.2 (*)     All other components within normal limits   COMPREHENSIVE METABOLIC PANEL   TSH   URINALYSIS, REFLEX TO URINE CULTURE   DRUG SCREEN PANEL, URINE EMERGENCY   ALCOHOL,MEDICAL (ETHANOL)          Imaging Results    None          Medications  - No data to display  Medical Decision Making:   Clinical Tests:   Lab Tests: Ordered and Reviewed  ED Management:  33-year-old male with a history of schizoaffective presents after episode of agitation.  Vitals normal.  Physical exam benign.  Labs pending at turnover.  Suspect they will be unremarkable.  Patient is medically stable for inpatient psych admission.  Gravely disabled.  Pec completed.  Needs medication adjustments.  Discussed with Dr. Kincaid who will f/up and labs.  Did bedside teaching.  All questions answered.  Patient acknowledges understanding.                 Medically cleared for psychiatry placement: 8/14/2023  8:43 PM         Clinical Impression:   Final diagnoses:  [R45.1] Agitation (Primary)  [Z00.8] Medical clearance for psychiatric admission        ED Disposition Condition    Transfer to Psych Facility Stable          ED Prescriptions    None       Follow-up Information    None          Yoseph Valdez MD  08/14/23 7834

## 2023-08-15 NOTE — ED NOTES
Patient resting in bed, eyes closed, rise/fall of chest noted. NAD, DVC maintained for safety, sitter present. Awaiting placement.

## 2023-08-15 NOTE — ED NOTES
"The patient is escorted to the restroom where he submitted his urine specimen. His gait is steady. He is attired in [facility] scrubs w/ fair grooming & hygiene. His affect is relaxed, pleasant, mood is pleasant & engaging. He states that he's schizoaffective & his bipolar & PTSD was acting up. He further states, " I was gonna black out." He denies S/HI. He states that he normally has AH but on yesterday he "felt someone brush past" him & he felt it on the hairs on his arm. He is maintained on DVC, sitter present.  "

## 2023-08-15 NOTE — ED TRIAGE NOTES
Lior Nguyen III, a 33 y.o. male presents to the ED w/ complaint of hx of schizoaffective d/o, violent behavior at home, denies si/hi/ah/vh, per EMS patient calm and cooperative.    Triage note:  Chief Complaint   Patient presents with    Psychiatric Evaluation     Hx of schizoaffective d/o, violent behavior at home, denies si/hi/ah/vh, per EMS patient calm and cooperative     Review of patient's allergies indicates:   Allergen Reactions    Bananas [banana]      Past Medical History:   Diagnosis Date    Anxiety     Bipolar disorder     History of psychiatric hospitalization     Hx of psychiatric care     Psychiatric problem     Schizoaffective disorder     Sleep difficulties     Suicide attempt

## 2023-08-15 NOTE — ED NOTES
Pt escorted off of the unit in a wheelchair by ED and security staff. Pt's PEC, transfer form, and all belongings given to [facility] staff. Pt remained calm and cooperative for the transfer.

## 2023-08-15 NOTE — ED NOTES
"Attemp @ providing report to [facility] @[phone], spoke w/ Nga who asks that we call back in 10 mins. As "we just recv'd the packet."  "

## 2023-08-15 NOTE — PROVIDER PROGRESS NOTES - EMERGENCY DEPT.
Patient was signed out to me by Dr. Valdez pending labs for clearance. Briefly, this is a 32yo M presenting with agitation, on PEC for GD.    Data:  Results for orders placed or performed during the hospital encounter of 08/14/23   CBC auto differential   Result Value Ref Range    WBC 7.55 3.90 - 12.70 K/uL    RBC 4.55 (L) 4.60 - 6.20 M/uL    Hemoglobin 12.9 (L) 14.0 - 18.0 g/dL    Hematocrit 41.3 40.0 - 54.0 %    MCV 91 82 - 98 fL    MCH 28.4 27.0 - 31.0 pg    MCHC 31.2 (L) 32.0 - 36.0 g/dL    RDW 12.3 11.5 - 14.5 %    Platelets 206 150 - 450 K/uL    MPV 10.7 9.2 - 12.9 fL    Immature Granulocytes 0.1 0.0 - 0.5 %    Gran # (ANC) 4.4 1.8 - 7.7 K/uL    Immature Grans (Abs) 0.01 0.00 - 0.04 K/uL    Lymph # 2.5 1.0 - 4.8 K/uL    Mono # 0.4 0.3 - 1.0 K/uL    Eos # 0.2 0.0 - 0.5 K/uL    Baso # 0.05 0.00 - 0.20 K/uL    nRBC 0 0 /100 WBC    Gran % 58.5 38.0 - 73.0 %    Lymph % 33.2 18.0 - 48.0 %    Mono % 5.0 4.0 - 15.0 %    Eosinophil % 2.5 0.0 - 8.0 %    Basophil % 0.7 0.0 - 1.9 %    Differential Method Automated    Comprehensive metabolic panel   Result Value Ref Range    Sodium 139 136 - 145 mmol/L    Potassium 4.0 3.5 - 5.1 mmol/L    Chloride 106 95 - 110 mmol/L    CO2 24 23 - 29 mmol/L    Glucose 77 70 - 110 mg/dL    BUN 13 6 - 20 mg/dL    Creatinine 1.0 0.5 - 1.4 mg/dL    Calcium 8.8 8.7 - 10.5 mg/dL    Total Protein 6.9 6.0 - 8.4 g/dL    Albumin 4.1 3.5 - 5.2 g/dL    Total Bilirubin 0.5 0.1 - 1.0 mg/dL    Alkaline Phosphatase 102 55 - 135 U/L    AST 15 10 - 40 U/L    ALT 13 10 - 44 U/L    eGFR >60.0 >60 mL/min/1.73 m^2    Anion Gap 9 8 - 16 mmol/L   TSH   Result Value Ref Range    TSH 1.445 0.400 - 4.000 uIU/mL   Ethanol   Result Value Ref Range    Alcohol, Serum <10 <10 mg/dL      MDM:   Labs unremarkable. Patient stable for further psychiatric care.

## 2023-09-02 ENCOUNTER — HOSPITAL ENCOUNTER (EMERGENCY)
Facility: HOSPITAL | Age: 33
Discharge: HOME | End: 2023-09-02
Attending: STUDENT IN AN ORGANIZED HEALTH CARE EDUCATION/TRAINING PROGRAM

## 2023-09-02 VITALS
RESPIRATION RATE: 17 BRPM | HEART RATE: 68 BPM | WEIGHT: 172 LBS | DIASTOLIC BLOOD PRESSURE: 68 MMHG | OXYGEN SATURATION: 99 % | SYSTOLIC BLOOD PRESSURE: 127 MMHG | HEIGHT: 74 IN | BODY MASS INDEX: 22.07 KG/M2 | TEMPERATURE: 98 F

## 2023-09-02 DIAGNOSIS — R10.13 DYSPEPSIA: Primary | ICD-10-CM

## 2023-09-02 PROCEDURE — 99282 EMERGENCY DEPT VISIT SF MDM: CPT

## 2023-09-02 PROCEDURE — 25000003 PHARM REV CODE 250: Performed by: STUDENT IN AN ORGANIZED HEALTH CARE EDUCATION/TRAINING PROGRAM

## 2023-09-02 RX ORDER — ALUMINUM HYDROXIDE, MAGNESIUM HYDROXIDE, AND SIMETHICONE 2400; 240; 2400 MG/30ML; MG/30ML; MG/30ML
5 SUSPENSION ORAL EVERY 6 HOURS PRN
Qty: 100 ML | Refills: 0 | Status: SHIPPED | OUTPATIENT
Start: 2023-09-02 | End: 2024-09-01

## 2023-09-02 RX ORDER — MAG HYDROX/ALUMINUM HYD/SIMETH 200-200-20
5 SUSPENSION, ORAL (FINAL DOSE FORM) ORAL
Status: COMPLETED | OUTPATIENT
Start: 2023-09-02 | End: 2023-09-02

## 2023-09-02 RX ADMIN — ALUMINUM HYDROXIDE, MAGNESIUM HYDROXIDE, AND SIMETHICONE 5 ML: 200; 200; 20 SUSPENSION ORAL at 08:09

## 2023-09-03 NOTE — ED NOTES
"Pt c/o throat pain after taking medication, states he feels like one "went down wrong" Pt having no difficulty talking, breathing, or swallowing. Pt resting comfortably on stretcher, NAD.  "

## 2023-09-03 NOTE — ED PROVIDER NOTES
NAME:  Lior Nguyen III  CSN:     [id number]  MRN:    [id number]  ADMIT DATE: 9/2/2023        eMERGENCY dEPARTMENT eNCOUnter    CHIEF COMPLAINT    Chief Complaint   Patient presents with    Foreign Body     States it feels like his pill went down the wrong way.  States it was one pill. Swallowing his saliva.       HPI    Lior Nguyen III is a 33 y.o. male with a past medical history of  has a past medical history of Anxiety, Bipolar disorder, History of psychiatric hospitalization, psychiatric care, Psychiatric problem, Schizoaffective disorder, Sleep difficulties, and Suicide attempt.     he presents to the ED due to burning sensation in the upper abdomen since around 6:00 p.m. when he took his Zyprexa tablet.  States he did take this with a half bottle of water.  No issues eating or swallowing.  Does admit to alcohol use last night and has not eaten since that time with some notable indigestion.  Denies any other symptoms at this time.      HPI       PAST MEDICAL HISTORY  Past Medical History:   Diagnosis Date    Anxiety     Bipolar disorder     History of psychiatric hospitalization     Hx of psychiatric care     Psychiatric problem     Schizoaffective disorder     Sleep difficulties     Suicide attempt        SURGICAL HISTORY    No past surgical history on file.    FAMILY HISTORY    Family History   Problem Relation Age of Onset    Depression Mother     Schizophrenia Father        SOCIAL HISTORY    Social History     Socioeconomic History    Marital status:      Spouse name: Dian Nguyen    Number of children: 0   Occupational History    Occupation:    Tobacco Use    Smoking status: Every Day     Current packs/day: 0.50     Types: Vaping with nicotine, Cigarettes    Smokeless tobacco: Never   Substance and Sexual Activity    Alcohol use: Yes     Alcohol/week: 4.0 standard drinks of alcohol     Types: 2 Glasses of wine, 2 Cans of beer per week     Comment: pt drinks occasionally     Drug use: Yes     Frequency: 7.0 times per week     Types: Marijuana     Comment: daily use    Sexual activity: Yes     Partners: Female   Other Topics Concern    Patient feels they ought to cut down on drinking/drug use No    Patient annoyed by others criticizing their drinking/drug use No    Patient has felt bad or guilty about drinking/drug use No    Patient has had a drink/used drugs as an eye opener in the AM No   Social History Narrative    Pt is a 32 year old  Male admitted to the [facility] on an OPC and subsequent PEC. Pt has been awake for 5 days and has been manic and bizarre. He was recently discharge from [facility] on 12/31/22.     Social Determinants of Health     Financial Resource Strain: High Risk (1/4/2023)    Overall Financial Resource Strain (CARDIA)     Difficulty of Paying Living Expenses: Very hard   Food Insecurity: Food Insecurity Present (1/4/2023)    Hunger Vital Sign     Worried About Running Out of Food in the Last Year: Often true     Ran Out of Food in the Last Year: Sometimes true   Transportation Needs: No Transportation Needs (1/4/2023)    PRAPARE - Transportation     Lack of Transportation (Medical): No     Lack of Transportation (Non-Medical): No   Physical Activity: Sufficiently Active (1/4/2023)    Exercise Vital Sign     Days of Exercise per Week: 7 days     Minutes of Exercise per Session: 120 min   Stress: Stress Concern Present (1/4/2023)    [employer] [place] [employer] - Occupational Stress Questionnaire     Feeling of Stress : Very much   Social Connections: Moderately Isolated (1/4/2023)    Social Connection and Isolation Panel [NHANES]     Frequency of Communication with Friends and Family: More than three times a week     Frequency of Social Gatherings with Friends and Family: Once a week     Attends [employer]ism Services: Never     Active Member of Clubs or Organizations: No     Attends Club or Organization Meetings: Never     Marital Status:  "   Housing Stability: High Risk (1/4/2023)    Housing Stability Vital Sign     Unable to Pay for Housing in the Last Year: Yes     Number of Places Lived in the Last Year: 1     Unstable Housing in the Last Year: No       MEDICATIONS  Current Outpatient Medications   Medication Instructions    aluminum & magnesium hydroxide-simethicone (MAALOX MAXIMUM STRENGTH) 400-400-40 mg/5 mL suspension 5 mLs, Oral, Every 6 hours PRN    cetirizine (ZYRTEC) 10 mg, Oral, Daily    fluticasone propionate (FLONASE) 50 mcg/actuation nasal spray SHAKE LIQUID AND USE 2 SPRAYS(100 MCG) IN EACH NOSTRIL EVERY DAY    hydrOXYzine pamoate (VISTARIL) 25 mg, Oral, Every 6 hours PRN    OLANZapine (ZYPREXA) 10 mg, Oral, Nightly       ALLERGIES    Review of patient's allergies indicates:   Allergen Reactions    Bananas [banana]          REVIEW OF SYSTEMS   Review of Systems       PHYSICAL EXAM    Reviewed Triage Note    VITAL SIGNS:   ED Triage Vitals   Enc Vitals Group      BP 09/02/23 1935 133/70      Pulse 09/02/23 1935 72      Resp 09/02/23 1935 18      Temp 09/02/23 1933 98.1 °F (36.7 °C)      Temp Source 09/02/23 1933 Oral      SpO2 09/02/23 1935 100 %      Weight 09/02/23 1935 172 lb      Height 09/02/23 1935 6' 2"      Head Circumference --       Peak Flow --       Pain Score --       Pain Loc --       Pain Edu? --       Excl. in GC? --        Patient Vitals for the past 24 hrs:   BP Temp Temp src Pulse Resp SpO2 Height Weight   09/02/23 1935 133/70 -- -- 72 18 100 % 6' 2" (1.88 m) 78 kg (172 lb)   09/02/23 1933 -- 98.1 °F (36.7 °C) Oral -- -- -- -- --       Physical Exam    Nursing note and vitals reviewed.  Constitutional: He appears well-developed and well-nourished.   HENT:   Head: Normocephalic and atraumatic.   Eyes: EOM are normal. Pupils are equal, round, and reactive to light.   Neck: Neck supple.   Normal range of motion.  Cardiovascular:  Normal rate and regular rhythm.           Pulmonary/Chest: Breath sounds normal. No " respiratory distress.   Abdominal: Abdomen is soft. There is no abdominal tenderness.   Musculoskeletal:         General: Normal range of motion.      Cervical back: Normal range of motion and neck supple.     Neurological: He is alert and oriented to person, place, and time.   Skin: Skin is warm and dry.   Psychiatric: He has a normal mood and affect.          EKG     Interpreted by EM physician if performed:               LABS  Pertinent labs reviewed. (See chart for details)   Labs Reviewed - No data to display      RADIOLOGY          Imaging Results    None           PROCEDURES    Procedures      ED COURSE & MEDICAL DECISION MAKING    Pertinent Labs & Imaging studies reviewed. (See chart for details and specific orders.)        Summary of review of records:     MDM:  Lior Nguyen III is a 33 y.o. male who presents with symptoms of burning sensation in the upper chest radiating to the abdomen since taking his Zyprexa 2 hours ago.  Is able to tolerate secretions and drink liquids as well as eat solids.  Does note alcohol use last night which he thinks is contributory to acid reflux like symptoms.    Differential diagnosis includes but is not limited to:  Pill esophagitis, dyspepsia, doubt esophageal impaction or acute obstruction.  Plan to treat symptomatically.  Does not require any advanced imaging or intervention at this time.  Agreeable with plan.  Strict return precautions provided and all questions addressed.        Medications   aluminum-magnesium hydroxide-simethicone 200-200-20 mg/5 mL suspension 5 mL (has no administration in time range)              FINAL IMPRESSION    Final diagnoses:  [R10.13] Dyspepsia (Primary)       DISPOSITION  Patient discharged in stable condition        ED Prescriptions       Medication Sig Dispense Start Date End Date Auth. Provider    aluminum & magnesium hydroxide-simethicone (MAALOX MAXIMUM STRENGTH) 400-400-40 mg/5 mL suspension Take 5 mLs by mouth every 6 (six) hours  as needed for Indigestion. 100 mL 9/2/2023 9/1/2024 Rosa Mcknight,           Follow-up Information       Follow up With Specialties Details Why Contact Info    Emma Mishra MD Family Medicine Schedule an appointment as soon as possible for a visit   [address]  [place] [address] [phone]      [facility] Emergency Dept Emergency Medicine  As needed, If symptoms worsen [address]  [facility] [address] [phone]              DISCLAIMER: This note was prepared with [employer] voice recognition transcription software. Garbled syntax, mangled pronouns, and other bizarre constructions may be attributed to that software system.      DO Roxanna Cline Hanna K.,   09/02/23 2037